# Patient Record
Sex: FEMALE | Race: WHITE | NOT HISPANIC OR LATINO | Employment: OTHER | URBAN - METROPOLITAN AREA
[De-identification: names, ages, dates, MRNs, and addresses within clinical notes are randomized per-mention and may not be internally consistent; named-entity substitution may affect disease eponyms.]

---

## 2017-07-26 ENCOUNTER — TRANSFERRED RECORDS (OUTPATIENT)
Dept: HEALTH INFORMATION MANAGEMENT | Facility: CLINIC | Age: 67
End: 2017-07-26

## 2018-04-03 ENCOUNTER — OFFICE VISIT (OUTPATIENT)
Dept: OBGYN | Facility: CLINIC | Age: 68
End: 2018-04-03
Payer: COMMERCIAL

## 2018-04-03 VITALS
HEIGHT: 63 IN | SYSTOLIC BLOOD PRESSURE: 144 MMHG | BODY MASS INDEX: 33.49 KG/M2 | DIASTOLIC BLOOD PRESSURE: 86 MMHG | WEIGHT: 189 LBS

## 2018-04-03 DIAGNOSIS — R10.2 VULVAR PAIN: ICD-10-CM

## 2018-04-03 DIAGNOSIS — L98.9 GROUPED SKIN LESIONS: Primary | ICD-10-CM

## 2018-04-03 PROCEDURE — 36415 COLL VENOUS BLD VENIPUNCTURE: CPT | Performed by: OBSTETRICS & GYNECOLOGY

## 2018-04-03 PROCEDURE — 86696 HERPES SIMPLEX TYPE 2 TEST: CPT | Performed by: OBSTETRICS & GYNECOLOGY

## 2018-04-03 PROCEDURE — 99214 OFFICE O/P EST MOD 30 MIN: CPT | Performed by: OBSTETRICS & GYNECOLOGY

## 2018-04-03 PROCEDURE — 86694 HERPES SIMPLEX NES ANTBDY: CPT | Performed by: OBSTETRICS & GYNECOLOGY

## 2018-04-03 PROCEDURE — 86695 HERPES SIMPLEX TYPE 1 TEST: CPT | Performed by: OBSTETRICS & GYNECOLOGY

## 2018-04-03 PROCEDURE — 87529 HSV DNA AMP PROBE: CPT | Performed by: OBSTETRICS & GYNECOLOGY

## 2018-04-03 RX ORDER — VALACYCLOVIR HYDROCHLORIDE 1 G/1
1000 TABLET, FILM COATED ORAL 3 TIMES DAILY
Qty: 21 TABLET | Refills: 0 | Status: SHIPPED | OUTPATIENT
Start: 2018-04-03 | End: 2024-02-09

## 2018-04-03 NOTE — MR AVS SNAPSHOT
"              After Visit Summary   4/3/2018    Renetta Bender    MRN: [id number]           Patient Information     Date Of Birth          1950        Visit Information        Provider Department      4/3/2018 11:30 AM Vesta Smith MD [facility]        Today's Diagnoses     Grouped skin lesions    -  1       Follow-ups after your visit        Who to contact     If you have questions or need follow up information about today's clinic visit or your schedule please contact [facility] directly at [phone].  Normal or non-critical lab and imaging results will be communicated to you by comScorehart, letter or phone within 4 business days after the clinic has received the results. If you do not hear from us within 7 days, please contact the clinic through comScorehart or phone. If you have a critical or abnormal lab result, we will notify you by phone as soon as possible.  Submit refill requests through Muxlim or call your pharmacy and they will forward the refill request to us. Please allow 3 business days for your refill to be completed.          Additional Information About Your Visit        MyChart Information     Muxlim lets you send messages to your doctor, view your test results, renew your prescriptions, schedule appointments and more. To sign up, go to [email or web address] . Click on \"Log in\" on the left side of the screen, which will take you to the Welcome page. Then click on \"Sign up Now\" on the right side of the page.     You will be asked to enter the access code listed below, as well as some personal information. Please follow the directions to create your username and password.     Your access code is: [email or web address]  Expires: 2018 12:01 PM     Your access code will  in 90 days. If you need help or a new code, please call your [facility] or [phone].        Care EveryWhere ID     This is your Care EveryWhere ID. This could be used by other " "organizations to access your [place] medical records  [id number]        Your Vitals Were     Height Breastfeeding? BMI (Body Mass Index)             5' 3.25\" (1.607 m) No 33.22 kg/m2          Blood Pressure from Last 3 Encounters:   04/03/18 144/86   02/10/16 128/82   11/14/12 114/78    Weight from Last 3 Encounters:   04/03/18 189 lb (85.7 kg)   02/10/16 181 lb (82.1 kg)   11/14/12 194 lb (88 kg)              We Performed the Following     Herpes Simplex Virus 1 and 2 IgG     Herpes: HSV IgM antibody     HSV 1 and 2 DNA by PCR          Today's Medication Changes          These changes are accurate as of 4/3/18 12:01 PM.  If you have any questions, ask your nurse or doctor.               Start taking these medicines.        Dose/Directions    valACYclovir 1000 mg tablet   Commonly known as:  VALTREX   Used for:  Grouped skin lesions   Started by:  Vesta Smith MD        Dose:  1000 mg   Take 1 tablet (1,000 mg) by mouth 3 times daily   Quantity:  21 tablet   Refills:  0            Where to get your medicines      These medications were sent to [facility] [address] [place] [address] [address] AT [facility] [address]  [address], [facility] [address]     Phone:  [phone]     valACYclovir 1000 mg tablet                Primary Care Provider Office Phone # Fax #    Schuyler Beverly Calderón [phone]       [facility] [address]  [facility] [address]        Equal Access to Services     LEWIS JACOB AH: Hadkwan mederoso Sodonny, waaxda luqadaha, qaybta kaalmada adeegfabio, waxay idikristie nielsen. So [facility] [phone].    ATENCIÓN: Si habla español, tiene a ding disposición servicios gratuitos de asistencia lingüística. Llame al [phone].    We comply with applicable federal civil rights laws and [place] laws. We do not discriminate on the basis of race, color, national origin, age, disability, sex, sexual orientation, or " gender identity.            Thank you!     Thank you for choosing [facility]  for your care. Our goal is always to provide you with excellent care. Hearing back from our patients is one way we can continue to improve our services. Please take a few minutes to complete the written survey that you may receive in the mail after your visit with us. Thank you!             Your Updated Medication List - Protect others around you: Learn how to safely use, store and throw away your medicines at www.disposemymeds.org.          This list is accurate as of 4/3/18 12:01 PM.  Always use your most recent med list.                   Brand Name Dispense Instructions for use Diagnosis    CALCIUM PLUS VITAMIN D PO      Take by mouth daily        clobetasol 0.05 % cream    TEMOVATE    45 g    Apply sparingly to affected area twice daily as needed.  Do not apply to face.    Vulvar itching       FISH OIL + D3 PO      Take by mouth daily        levothyroxine 75 MCG tablet    SYNTHROID/LEVOTHROID     Take by mouth daily        Multi-vitamin Tabs tablet      Take 1 tablet by mouth daily        valACYclovir 1000 mg tablet    VALTREX    21 tablet    Take 1 tablet (1,000 mg) by mouth 3 times daily    Grouped skin lesions

## 2018-04-03 NOTE — PROGRESS NOTES
SUBJECTIVE:                                                   Renetta Bender is a 68 year old female who presents to clinic today for the following health issue(s):  Patient presents with:  Vaginal Problem: possible herpes outbreak, sx's started friday, never had one before, had exposure 30+years ago        HPI:  Patient presents with painful clusters of raised red blister lesions on upper inner left thigh and lower left buttock  Started 2 days ago  Not on vulva  No prior history of similar lesions  Says she had shingles vaccine and has had shingles outbreak in past    She thinks she has a primary herpes outbreak from a recent oral sex encounter, partner has known history of oral herpes  No dysuria, fever      No LMP recorded. Patient is postmenopausal..   Patient is not sexually active, .  Using menopause for contraception.    reports that she has never smoked. She has never used smokeless tobacco.    STD testing offered?  Declined    Health maintenance updated:  yes    Today's PHQ-2 Score: No flowsheet data found.  Today's PHQ-9 Score:   PHQ-9 SCORE 2/10/2016   Total Score 0     Today's SAMEER-7 Score:   SAMEER-7 SCORE 2/10/2016   Total Score 0       Problem list and histories reviewed & adjusted, as indicated.  Additional history: as documented.    There is no problem list on file for this patient.    Past Surgical History:   Procedure Laterality Date     NO HISTORY OF SURGERY        Social History   Substance Use Topics     Smoking status: Never Smoker     Smokeless tobacco: Never Used     Alcohol use 0.0 oz/week     0 Standard drinks or equivalent per week      Problem (# of Occurrences) Relation (Name,Age of Onset)    Breast Cancer (1) Other: aunt    Hyperlipidemia (1) Mother    Hypertension (1) Mother    OSTEOPOROSIS (1) Mother            Current Outpatient Prescriptions   Medication Sig     valACYclovir (VALTREX) 1000 mg tablet Take 1 tablet (1,000 mg) by mouth 3 times daily     clobetasol (TEMOVATE)  "0.05 % cream Apply sparingly to affected area twice daily as needed.  Do not apply to face.     levothyroxine (SYNTHROID, LEVOTHROID) 75 MCG tablet Take by mouth daily     multivitamin, therapeutic with minerals (MULTI-VITAMIN) TABS Take 1 tablet by mouth daily     Calcium Carbonate-Vitamin D (CALCIUM PLUS VITAMIN D PO) Take by mouth daily     Fish Oil-Cholecalciferol (FISH OIL + D3 PO) Take by mouth daily     No current facility-administered medications for this visit.      Allergies   Allergen Reactions     Penicillin G      rash     Sulfa Drugs      rash       ROS:  12 point review of systems negative other than symptoms noted below.    OBJECTIVE:     /86  Ht 5' 3.25\" (1.607 m)  Wt 189 lb (85.7 kg)  Breastfeeding? No  BMI 33.22 kg/m2  Body mass index is 33.22 kg/(m^2).    Exam:  Constitutional:  Appearance: Well nourished, well developed alert, in no acute distress  Skin:General Inspection:  No rashes present, no lesions present, no areas of discoloration; Genitalia and Groin:   Several groups of clustered red blister  lesions present, located on inner upper left thigh and lower buttock on left, no areas of discoloration, no masses present.   The blisters are tender    They look like shingles lesions more than herpes  Neurologic/Psychiatric:  Mental Status:  Oriented X3   Pelvic Exam:  External Genitalia:     Normal appearance for age, no discharge present, no tenderness present, no inflammatory lesions present, color normal               No lesions seen directly on vulva  Vagina:     Normal vaginal vault without central or paravaginal defects, no discharge present, no inflammatory lesions present, no masses present  Bladder:     Nontender to palpation  Urethra:   Urethral Body:  Urethra palpation normal, urethra structural support normal   Urethral Meatus:  No erythema or lesions present  Cervix:     Appearance healthy, no lesions present, nontender to palpation, no bleeding present  Uterus:  "    Uterus: firm, normal sized and nontender, midplane in position.   Adnexa:     No adnexal tenderness present, no adnexal masses present  Perineum:     Perineum within normal limits, no evidence of trauma, no rashes or skin lesions present  Anus:     Anus within normal limits, no hemorrhoids present  Inguinal Lymph Nodes:     No lymphadenopathy present  Pubic Hair:     Normal pubic hair distribution for age  Genitalia and Groin:     No rashes present, no lesions present, no areas of discoloration, no masses present       In-Clinic Test Results:  No results found for this or any previous visit (from the past 24 hour(s)).    ASSESSMENT/PLAN:                                                        ICD-10-CM    1. Grouped skin lesions L98.9 Herpes Simplex Virus 1 and 2 IgG     Herpes: HSV IgM antibody     HSV 1 and 2 DNA by PCR     valACYclovir (VALTREX) 1000 mg tablet   2. Vulvar pain R10.2        Patient denies a history of herpes  Has had shingles in past and was also vaccinated  I unroofed a couple of the lesions to obtain fluid for pcr for herpes  Also sending antibody testing to check prior exposure status  Discussed herpes and shingles as most likely issues in differential diagnosis  Treatment is done with valtrex either way so I sent prescription for valtrex 1g tid for 7 days just in case it is shingles  If pcr comes back herpes , she can reduce the dose in future if she has recurrences  Also discussed poss supressive therapy if she gets recurrences often    Discussed available new shingles vaccine with higher success rate  rec revaccinate one her current lesions are healed        Vesta Smith MD  [facility]

## 2018-04-04 LAB
HSV1 DNA SPEC QL NAA+PROBE: NEGATIVE
HSV1 IGG SERPL QL IA: 6.1 AI (ref 0–0.8)
HSV2 DNA SPEC QL NAA+PROBE: NEGATIVE
HSV2 IGG SERPL QL IA: <0.2 AI (ref 0–0.8)
SPECIMEN SOURCE: NORMAL

## 2018-04-10 LAB — HSV 1+2 IGM SER-IMP: 0.35 INDEX VALUE (ref 0–0.89)

## 2018-08-01 ENCOUNTER — TRANSFERRED RECORDS (OUTPATIENT)
Dept: HEALTH INFORMATION MANAGEMENT | Facility: CLINIC | Age: 68
End: 2018-08-01

## 2019-09-29 ENCOUNTER — HEALTH MAINTENANCE LETTER (OUTPATIENT)
Age: 69
End: 2019-09-29

## 2020-01-21 ENCOUNTER — TRANSFERRED RECORDS (OUTPATIENT)
Dept: HEALTH INFORMATION MANAGEMENT | Facility: CLINIC | Age: 70
End: 2020-01-21

## 2020-01-27 ENCOUNTER — TRANSFERRED RECORDS (OUTPATIENT)
Dept: HEALTH INFORMATION MANAGEMENT | Facility: CLINIC | Age: 70
End: 2020-01-27

## 2020-03-15 ENCOUNTER — HEALTH MAINTENANCE LETTER (OUTPATIENT)
Age: 70
End: 2020-03-15

## 2021-01-14 ENCOUNTER — HEALTH MAINTENANCE LETTER (OUTPATIENT)
Age: 71
End: 2021-01-14

## 2021-01-28 ENCOUNTER — TRANSFERRED RECORDS (OUTPATIENT)
Dept: HEALTH INFORMATION MANAGEMENT | Facility: CLINIC | Age: 71
End: 2021-01-28

## 2021-05-09 ENCOUNTER — HEALTH MAINTENANCE LETTER (OUTPATIENT)
Age: 71
End: 2021-05-09

## 2021-10-24 ENCOUNTER — HEALTH MAINTENANCE LETTER (OUTPATIENT)
Age: 71
End: 2021-10-24

## 2022-06-05 ENCOUNTER — HEALTH MAINTENANCE LETTER (OUTPATIENT)
Age: 72
End: 2022-06-05

## 2022-07-05 ENCOUNTER — LAB REQUISITION (OUTPATIENT)
Dept: LAB | Facility: CLINIC | Age: 72
End: 2022-07-05
Payer: COMMERCIAL

## 2022-07-05 DIAGNOSIS — H92.12 OTORRHEA, LEFT EAR: ICD-10-CM

## 2022-07-05 PROCEDURE — 87077 CULTURE AEROBIC IDENTIFY: CPT | Mod: ORL | Performed by: OTOLARYNGOLOGY

## 2022-07-09 LAB
BACTERIA SPEC CULT: ABNORMAL
BACTERIA SPEC CULT: ABNORMAL

## 2022-10-15 ENCOUNTER — HEALTH MAINTENANCE LETTER (OUTPATIENT)
Age: 72
End: 2022-10-15

## 2023-05-27 ENCOUNTER — HEALTH MAINTENANCE LETTER (OUTPATIENT)
Age: 73
End: 2023-05-27

## 2023-06-11 ENCOUNTER — HEALTH MAINTENANCE LETTER (OUTPATIENT)
Age: 73
End: 2023-06-11

## 2024-01-09 ENCOUNTER — MEDICAL CORRESPONDENCE (OUTPATIENT)
Dept: HEALTH INFORMATION MANAGEMENT | Facility: CLINIC | Age: 74
End: 2024-01-09

## 2024-02-09 RX ORDER — ACETAMINOPHEN 500 MG
1000 TABLET ORAL 3 TIMES DAILY
COMMUNITY
End: 2024-05-03

## 2024-02-09 NOTE — PROGRESS NOTES
PTA medications updated by Medication Scribe prior to surgery via phone call with patient (last doses completed by Nurse)     Medication history sources: Patient and H&P  In the past week, patient estimated taking medication this percent of the time: Greater than 90%      Significant changes made to the medication list:  Patient reports no longer taking the following meds (med scribe removed from PTA med list): calcium,temovate,fishoil,valtrex      Additional medication history information:   None    Medication reconciliation completed by provider prior to medication history? No    Time spent in this activity: 20 minutes    The information provided in this note is only as accurate as the sources available at the time of update(s)      Prior to Admission medications    Medication Sig Last Dose Taking? Auth Provider Long Term End Date   acetaminophen (TYLENOL) 500 MG tablet Take 1,000 mg by mouth 3 times daily 02/11/2024 at pm Yes Reported, Patient     levothyroxine (SYNTHROID, LEVOTHROID) 75 MCG tablet Take by mouth daily 02/12/2024 at am Yes Reported, Patient Yes    multivitamin, therapeutic with minerals (MULTI-VITAMIN) TABS Take 1 tablet by mouth daily 02/06/2024 at am Yes Reported, Patient         Medication history completed by: Danisha Rodriguez

## 2024-02-11 ENCOUNTER — ANESTHESIA EVENT (OUTPATIENT)
Dept: SURGERY | Facility: CLINIC | Age: 74
End: 2024-02-11
Payer: COMMERCIAL

## 2024-02-11 ASSESSMENT — LIFESTYLE VARIABLES: TOBACCO_USE: 0

## 2024-02-11 ASSESSMENT — COPD QUESTIONNAIRES: COPD: 0

## 2024-02-12 ENCOUNTER — APPOINTMENT (OUTPATIENT)
Dept: GENERAL RADIOLOGY | Facility: CLINIC | Age: 74
End: 2024-02-12
Attending: ORTHOPAEDIC SURGERY
Payer: COMMERCIAL

## 2024-02-12 ENCOUNTER — ANESTHESIA (OUTPATIENT)
Dept: SURGERY | Facility: CLINIC | Age: 74
End: 2024-02-12
Payer: COMMERCIAL

## 2024-02-12 ENCOUNTER — HOSPITAL ENCOUNTER (OUTPATIENT)
Facility: CLINIC | Age: 74
Discharge: HOME | End: 2024-02-13
Attending: ORTHOPAEDIC SURGERY | Admitting: ORTHOPAEDIC SURGERY
Payer: COMMERCIAL

## 2024-02-12 ENCOUNTER — APPOINTMENT (OUTPATIENT)
Dept: PHYSICAL THERAPY | Facility: CLINIC | Age: 74
End: 2024-02-12
Attending: ORTHOPAEDIC SURGERY
Payer: COMMERCIAL

## 2024-02-12 DIAGNOSIS — Z96.641 S/P TOTAL RIGHT HIP ARTHROPLASTY: Primary | ICD-10-CM

## 2024-02-12 PROBLEM — Z96.649 S/P TOTAL HIP ARTHROPLASTY: Status: ACTIVE | Noted: 2024-02-12

## 2024-02-12 PROCEDURE — 97116 GAIT TRAINING THERAPY: CPT | Mod: GP

## 2024-02-12 PROCEDURE — 360N000084 HC SURGERY LEVEL 4 W/ FLUORO, PER MIN: Performed by: ORTHOPAEDIC SURGERY

## 2024-02-12 PROCEDURE — 999N000065 XR PELVIS AND HIP PORTABLE RIGHT 1 VIEW

## 2024-02-12 PROCEDURE — 272N000001 HC OR GENERAL SUPPLY STERILE: Performed by: ORTHOPAEDIC SURGERY

## 2024-02-12 PROCEDURE — C1776 JOINT DEVICE (IMPLANTABLE): HCPCS | Performed by: ORTHOPAEDIC SURGERY

## 2024-02-12 PROCEDURE — 97530 THERAPEUTIC ACTIVITIES: CPT | Mod: GP

## 2024-02-12 PROCEDURE — 999N000141 HC STATISTIC PRE-PROCEDURE NURSING ASSESSMENT: Performed by: ORTHOPAEDIC SURGERY

## 2024-02-12 PROCEDURE — C1713 ANCHOR/SCREW BN/BN,TIS/BN: HCPCS | Performed by: ORTHOPAEDIC SURGERY

## 2024-02-12 PROCEDURE — 250N000025 HC SEVOFLURANE, PER MIN: Performed by: ORTHOPAEDIC SURGERY

## 2024-02-12 PROCEDURE — 250N000011 HC RX IP 250 OP 636: Performed by: NURSE ANESTHETIST, CERTIFIED REGISTERED

## 2024-02-12 PROCEDURE — 250N000011 HC RX IP 250 OP 636: Performed by: ORTHOPAEDIC SURGERY

## 2024-02-12 PROCEDURE — 258N000001 HC RX 258: Performed by: ORTHOPAEDIC SURGERY

## 2024-02-12 PROCEDURE — 250N000011 HC RX IP 250 OP 636: Performed by: ANESTHESIOLOGY

## 2024-02-12 PROCEDURE — 258N000003 HC RX IP 258 OP 636: Performed by: NURSE ANESTHETIST, CERTIFIED REGISTERED

## 2024-02-12 PROCEDURE — 250N000009 HC RX 250: Performed by: NURSE ANESTHETIST, CERTIFIED REGISTERED

## 2024-02-12 PROCEDURE — 97161 PT EVAL LOW COMPLEX 20 MIN: CPT | Mod: GP

## 2024-02-12 PROCEDURE — 250N000013 HC RX MED GY IP 250 OP 250 PS 637: Performed by: ORTHOPAEDIC SURGERY

## 2024-02-12 PROCEDURE — 258N000003 HC RX IP 258 OP 636: Performed by: ANESTHESIOLOGY

## 2024-02-12 PROCEDURE — 370N000017 HC ANESTHESIA TECHNICAL FEE, PER MIN: Performed by: ORTHOPAEDIC SURGERY

## 2024-02-12 PROCEDURE — 999N000179 XR SURGERY CARM FLUORO LESS THAN 5 MIN W STILLS

## 2024-02-12 PROCEDURE — 258N000003 HC RX IP 258 OP 636: Performed by: ORTHOPAEDIC SURGERY

## 2024-02-12 PROCEDURE — 710N000009 HC RECOVERY PHASE 1, LEVEL 1, PER MIN: Performed by: ORTHOPAEDIC SURGERY

## 2024-02-12 PROCEDURE — 250N000009 HC RX 250: Performed by: ORTHOPAEDIC SURGERY

## 2024-02-12 DEVICE — IMPLANTABLE DEVICE
Type: IMPLANTABLE DEVICE | Site: HIP | Status: FUNCTIONAL
Brand: G7® ACETABULAR SYSTEM

## 2024-02-12 DEVICE — BIOLOX® DELTA, CERAMIC FEMORAL HEAD, M, Ø 36/0, TAPER 12/14
Type: IMPLANTABLE DEVICE | Site: HIP | Status: FUNCTIONAL
Brand: BIOLOX® DELTA

## 2024-02-12 DEVICE — IMPLANTABLE DEVICE: Type: IMPLANTABLE DEVICE | Site: HIP | Status: FUNCTIONAL

## 2024-02-12 DEVICE — IMPLANTABLE DEVICE
Type: IMPLANTABLE DEVICE | Site: HIP | Status: FUNCTIONAL
Brand: KINECTIV®

## 2024-02-12 RX ORDER — LIDOCAINE 40 MG/G
CREAM TOPICAL
Status: DISCONTINUED | OUTPATIENT
Start: 2024-02-12 | End: 2024-02-13 | Stop reason: HOSPADM

## 2024-02-12 RX ORDER — SODIUM CHLORIDE, SODIUM LACTATE, POTASSIUM CHLORIDE, CALCIUM CHLORIDE 600; 310; 30; 20 MG/100ML; MG/100ML; MG/100ML; MG/100ML
INJECTION, SOLUTION INTRAVENOUS CONTINUOUS
Status: DISCONTINUED | OUTPATIENT
Start: 2024-02-12 | End: 2024-02-13 | Stop reason: HOSPADM

## 2024-02-12 RX ORDER — BISACODYL 10 MG
10 SUPPOSITORY, RECTAL RECTAL DAILY PRN
Status: DISCONTINUED | OUTPATIENT
Start: 2024-02-12 | End: 2024-02-13 | Stop reason: HOSPADM

## 2024-02-12 RX ORDER — FENTANYL CITRATE 50 UG/ML
50 INJECTION, SOLUTION INTRAMUSCULAR; INTRAVENOUS EVERY 5 MIN PRN
Status: DISCONTINUED | OUTPATIENT
Start: 2024-02-12 | End: 2024-02-12 | Stop reason: HOSPADM

## 2024-02-12 RX ORDER — MULTIPLE VITAMINS W/ MINERALS TAB 9MG-400MCG
1 TAB ORAL DAILY
Status: DISCONTINUED | OUTPATIENT
Start: 2024-02-13 | End: 2024-02-13 | Stop reason: HOSPADM

## 2024-02-12 RX ORDER — ASPIRIN 325 MG
325 TABLET, DELAYED RELEASE (ENTERIC COATED) ORAL DAILY
Status: DISCONTINUED | OUTPATIENT
Start: 2024-02-12 | End: 2024-02-13 | Stop reason: HOSPADM

## 2024-02-12 RX ORDER — HYDROXYZINE HYDROCHLORIDE 10 MG/1
10 TABLET, FILM COATED ORAL EVERY 6 HOURS PRN
Qty: 30 TABLET | Refills: 0 | Status: SHIPPED | OUTPATIENT
Start: 2024-02-12 | End: 2024-05-03

## 2024-02-12 RX ORDER — HYDROXYZINE HYDROCHLORIDE 10 MG/1
10 TABLET, FILM COATED ORAL EVERY 6 HOURS PRN
Status: DISCONTINUED | OUTPATIENT
Start: 2024-02-12 | End: 2024-02-13 | Stop reason: HOSPADM

## 2024-02-12 RX ORDER — DEXAMETHASONE SODIUM PHOSPHATE 4 MG/ML
INJECTION, SOLUTION INTRA-ARTICULAR; INTRALESIONAL; INTRAMUSCULAR; INTRAVENOUS; SOFT TISSUE PRN
Status: DISCONTINUED | OUTPATIENT
Start: 2024-02-12 | End: 2024-02-12

## 2024-02-12 RX ORDER — ONDANSETRON 2 MG/ML
INJECTION INTRAMUSCULAR; INTRAVENOUS PRN
Status: DISCONTINUED | OUTPATIENT
Start: 2024-02-12 | End: 2024-02-12

## 2024-02-12 RX ORDER — ACETAMINOPHEN 325 MG/1
650 TABLET ORAL EVERY 4 HOURS PRN
Status: DISCONTINUED | OUTPATIENT
Start: 2024-02-15 | End: 2024-02-13 | Stop reason: HOSPADM

## 2024-02-12 RX ORDER — PROCHLORPERAZINE MALEATE 5 MG
5 TABLET ORAL EVERY 6 HOURS PRN
Status: DISCONTINUED | OUTPATIENT
Start: 2024-02-12 | End: 2024-02-13 | Stop reason: HOSPADM

## 2024-02-12 RX ORDER — HYDROMORPHONE HCL IN WATER/PF 6 MG/30 ML
0.2 PATIENT CONTROLLED ANALGESIA SYRINGE INTRAVENOUS
Status: DISCONTINUED | OUTPATIENT
Start: 2024-02-12 | End: 2024-02-13 | Stop reason: HOSPADM

## 2024-02-12 RX ORDER — CEFAZOLIN SODIUM 2 G/100ML
2 INJECTION, SOLUTION INTRAVENOUS EVERY 8 HOURS
Status: COMPLETED | OUTPATIENT
Start: 2024-02-12 | End: 2024-02-13

## 2024-02-12 RX ORDER — CEFAZOLIN SODIUM/WATER 2 G/20 ML
2 SYRINGE (ML) INTRAVENOUS SEE ADMIN INSTRUCTIONS
Status: DISCONTINUED | OUTPATIENT
Start: 2024-02-12 | End: 2024-02-12 | Stop reason: HOSPADM

## 2024-02-12 RX ORDER — MAGNESIUM HYDROXIDE 1200 MG/15ML
LIQUID ORAL PRN
Status: DISCONTINUED | OUTPATIENT
Start: 2024-02-12 | End: 2024-02-12 | Stop reason: HOSPADM

## 2024-02-12 RX ORDER — HYDROMORPHONE HCL IN WATER/PF 6 MG/30 ML
0.2 PATIENT CONTROLLED ANALGESIA SYRINGE INTRAVENOUS EVERY 5 MIN PRN
Status: DISCONTINUED | OUTPATIENT
Start: 2024-02-12 | End: 2024-02-12 | Stop reason: HOSPADM

## 2024-02-12 RX ORDER — NALOXONE HYDROCHLORIDE 0.4 MG/ML
0.4 INJECTION, SOLUTION INTRAMUSCULAR; INTRAVENOUS; SUBCUTANEOUS
Status: DISCONTINUED | OUTPATIENT
Start: 2024-02-12 | End: 2024-02-13 | Stop reason: HOSPADM

## 2024-02-12 RX ORDER — HYDROMORPHONE HCL IN WATER/PF 6 MG/30 ML
0.4 PATIENT CONTROLLED ANALGESIA SYRINGE INTRAVENOUS EVERY 5 MIN PRN
Status: DISCONTINUED | OUTPATIENT
Start: 2024-02-12 | End: 2024-02-12 | Stop reason: HOSPADM

## 2024-02-12 RX ORDER — NALOXONE HYDROCHLORIDE 0.4 MG/ML
0.2 INJECTION, SOLUTION INTRAMUSCULAR; INTRAVENOUS; SUBCUTANEOUS
Status: DISCONTINUED | OUTPATIENT
Start: 2024-02-12 | End: 2024-02-13 | Stop reason: HOSPADM

## 2024-02-12 RX ORDER — METHOCARBAMOL 500 MG/1
500 TABLET, FILM COATED ORAL EVERY 6 HOURS PRN
Status: DISCONTINUED | OUTPATIENT
Start: 2024-02-12 | End: 2024-02-13 | Stop reason: HOSPADM

## 2024-02-12 RX ORDER — VANCOMYCIN HYDROCHLORIDE 1 G/20ML
INJECTION, POWDER, LYOPHILIZED, FOR SOLUTION INTRAVENOUS PRN
Status: DISCONTINUED | OUTPATIENT
Start: 2024-02-12 | End: 2024-02-12 | Stop reason: HOSPADM

## 2024-02-12 RX ORDER — CEFAZOLIN SODIUM/WATER 2 G/20 ML
2 SYRINGE (ML) INTRAVENOUS
Status: COMPLETED | OUTPATIENT
Start: 2024-02-12 | End: 2024-02-12

## 2024-02-12 RX ORDER — HYDROMORPHONE HYDROCHLORIDE 1 MG/ML
INJECTION, SOLUTION INTRAMUSCULAR; INTRAVENOUS; SUBCUTANEOUS PRN
Status: DISCONTINUED | OUTPATIENT
Start: 2024-02-12 | End: 2024-02-12

## 2024-02-12 RX ORDER — ONDANSETRON 4 MG/1
4 TABLET, ORALLY DISINTEGRATING ORAL EVERY 30 MIN PRN
Status: DISCONTINUED | OUTPATIENT
Start: 2024-02-12 | End: 2024-02-12 | Stop reason: HOSPADM

## 2024-02-12 RX ORDER — DIPHENHYDRAMINE HCL 12.5MG/5ML
12.5 LIQUID (ML) ORAL EVERY 6 HOURS PRN
Status: DISCONTINUED | OUTPATIENT
Start: 2024-02-12 | End: 2024-02-13 | Stop reason: HOSPADM

## 2024-02-12 RX ORDER — ONDANSETRON 2 MG/ML
4 INJECTION INTRAMUSCULAR; INTRAVENOUS EVERY 6 HOURS PRN
Status: DISCONTINUED | OUTPATIENT
Start: 2024-02-12 | End: 2024-02-13 | Stop reason: HOSPADM

## 2024-02-12 RX ORDER — CEFAZOLIN SODIUM/WATER 2 G/20 ML
2 SYRINGE (ML) INTRAVENOUS EVERY 8 HOURS
Qty: 40 ML | Refills: 0 | Status: DISCONTINUED | OUTPATIENT
Start: 2024-02-12 | End: 2024-02-12

## 2024-02-12 RX ORDER — OXYCODONE HYDROCHLORIDE 5 MG/1
5 TABLET ORAL EVERY 4 HOURS PRN
Status: DISCONTINUED | OUTPATIENT
Start: 2024-02-12 | End: 2024-02-13 | Stop reason: HOSPADM

## 2024-02-12 RX ORDER — EPHEDRINE SULFATE 50 MG/ML
INJECTION, SOLUTION INTRAMUSCULAR; INTRAVENOUS; SUBCUTANEOUS PRN
Status: DISCONTINUED | OUTPATIENT
Start: 2024-02-12 | End: 2024-02-12

## 2024-02-12 RX ORDER — AMOXICILLIN 250 MG
1 CAPSULE ORAL 2 TIMES DAILY
Status: DISCONTINUED | OUTPATIENT
Start: 2024-02-12 | End: 2024-02-13 | Stop reason: HOSPADM

## 2024-02-12 RX ORDER — HYDROMORPHONE HCL IN WATER/PF 6 MG/30 ML
0.4 PATIENT CONTROLLED ANALGESIA SYRINGE INTRAVENOUS
Status: DISCONTINUED | OUTPATIENT
Start: 2024-02-12 | End: 2024-02-13 | Stop reason: HOSPADM

## 2024-02-12 RX ORDER — ACETAMINOPHEN 325 MG/1
975 TABLET ORAL EVERY 8 HOURS
Status: DISCONTINUED | OUTPATIENT
Start: 2024-02-12 | End: 2024-02-13 | Stop reason: HOSPADM

## 2024-02-12 RX ORDER — TRANEXAMIC ACID 650 MG/1
1950 TABLET ORAL ONCE
Status: COMPLETED | OUTPATIENT
Start: 2024-02-12 | End: 2024-02-12

## 2024-02-12 RX ORDER — AMOXICILLIN 250 MG
1-2 CAPSULE ORAL 2 TIMES DAILY
Qty: 30 TABLET | Refills: 0 | Status: SHIPPED | OUTPATIENT
Start: 2024-02-12 | End: 2024-05-03

## 2024-02-12 RX ORDER — FENTANYL CITRATE 50 UG/ML
INJECTION, SOLUTION INTRAMUSCULAR; INTRAVENOUS PRN
Status: DISCONTINUED | OUTPATIENT
Start: 2024-02-12 | End: 2024-02-12

## 2024-02-12 RX ORDER — ONDANSETRON 2 MG/ML
4 INJECTION INTRAMUSCULAR; INTRAVENOUS EVERY 30 MIN PRN
Status: DISCONTINUED | OUTPATIENT
Start: 2024-02-12 | End: 2024-02-12 | Stop reason: HOSPADM

## 2024-02-12 RX ORDER — SODIUM CHLORIDE, SODIUM LACTATE, POTASSIUM CHLORIDE, CALCIUM CHLORIDE 600; 310; 30; 20 MG/100ML; MG/100ML; MG/100ML; MG/100ML
INJECTION, SOLUTION INTRAVENOUS CONTINUOUS
Status: DISCONTINUED | OUTPATIENT
Start: 2024-02-12 | End: 2024-02-12 | Stop reason: HOSPADM

## 2024-02-12 RX ORDER — OXYCODONE HYDROCHLORIDE 5 MG/1
10 TABLET ORAL EVERY 4 HOURS PRN
Status: DISCONTINUED | OUTPATIENT
Start: 2024-02-12 | End: 2024-02-13 | Stop reason: HOSPADM

## 2024-02-12 RX ORDER — DEXMEDETOMIDINE HYDROCHLORIDE 4 UG/ML
INJECTION, SOLUTION INTRAVENOUS PRN
Status: DISCONTINUED | OUTPATIENT
Start: 2024-02-12 | End: 2024-02-12

## 2024-02-12 RX ORDER — ONDANSETRON 4 MG/1
4 TABLET, ORALLY DISINTEGRATING ORAL EVERY 6 HOURS PRN
Status: DISCONTINUED | OUTPATIENT
Start: 2024-02-12 | End: 2024-02-13 | Stop reason: HOSPADM

## 2024-02-12 RX ORDER — FENTANYL CITRATE 50 UG/ML
25 INJECTION, SOLUTION INTRAMUSCULAR; INTRAVENOUS EVERY 5 MIN PRN
Status: DISCONTINUED | OUTPATIENT
Start: 2024-02-12 | End: 2024-02-12 | Stop reason: HOSPADM

## 2024-02-12 RX ORDER — ACETAMINOPHEN 325 MG/1
650 TABLET ORAL EVERY 4 HOURS PRN
Qty: 100 TABLET | Refills: 0 | Status: SHIPPED | OUTPATIENT
Start: 2024-02-12 | End: 2024-02-12

## 2024-02-12 RX ORDER — LIDOCAINE HYDROCHLORIDE 20 MG/ML
INJECTION, SOLUTION INFILTRATION; PERINEURAL PRN
Status: DISCONTINUED | OUTPATIENT
Start: 2024-02-12 | End: 2024-02-12

## 2024-02-12 RX ORDER — ASPIRIN 325 MG
325 TABLET, DELAYED RELEASE (ENTERIC COATED) ORAL DAILY
Qty: 30 TABLET | Refills: 0 | Status: SHIPPED | OUTPATIENT
Start: 2024-02-12 | End: 2024-05-03

## 2024-02-12 RX ORDER — PROPOFOL 10 MG/ML
INJECTION, EMULSION INTRAVENOUS PRN
Status: DISCONTINUED | OUTPATIENT
Start: 2024-02-12 | End: 2024-02-12

## 2024-02-12 RX ORDER — POLYETHYLENE GLYCOL 3350 17 G/17G
17 POWDER, FOR SOLUTION ORAL DAILY
Status: DISCONTINUED | OUTPATIENT
Start: 2024-02-13 | End: 2024-02-13 | Stop reason: HOSPADM

## 2024-02-12 RX ORDER — OXYCODONE HYDROCHLORIDE 5 MG/1
5-10 TABLET ORAL EVERY 4 HOURS PRN
Qty: 25 TABLET | Refills: 0 | Status: SHIPPED | OUTPATIENT
Start: 2024-02-12 | End: 2024-05-03

## 2024-02-12 RX ORDER — LEVOTHYROXINE SODIUM 75 UG/1
75 TABLET ORAL DAILY
Status: DISCONTINUED | OUTPATIENT
Start: 2024-02-13 | End: 2024-02-13 | Stop reason: HOSPADM

## 2024-02-12 RX ADMIN — Medication 2 G: at 09:24

## 2024-02-12 RX ADMIN — DEXMEDETOMIDINE HYDROCHLORIDE 12 MCG: 200 INJECTION INTRAVENOUS at 11:52

## 2024-02-12 RX ADMIN — HYDROXYZINE HYDROCHLORIDE 10 MG: 10 TABLET, FILM COATED ORAL at 16:54

## 2024-02-12 RX ADMIN — PROPOFOL 180 MG: 10 INJECTION, EMULSION INTRAVENOUS at 09:20

## 2024-02-12 RX ADMIN — SODIUM CHLORIDE, POTASSIUM CHLORIDE, SODIUM LACTATE AND CALCIUM CHLORIDE: 600; 310; 30; 20 INJECTION, SOLUTION INTRAVENOUS at 07:48

## 2024-02-12 RX ADMIN — Medication 5 MG: at 10:39

## 2024-02-12 RX ADMIN — FENTANYL CITRATE 25 MCG: 50 INJECTION, SOLUTION INTRAMUSCULAR; INTRAVENOUS at 13:03

## 2024-02-12 RX ADMIN — FENTANYL CITRATE 100 MCG: 50 INJECTION INTRAMUSCULAR; INTRAVENOUS at 09:20

## 2024-02-12 RX ADMIN — FENTANYL CITRATE 25 MCG: 50 INJECTION, SOLUTION INTRAMUSCULAR; INTRAVENOUS at 13:38

## 2024-02-12 RX ADMIN — SUGAMMADEX 200 MG: 100 INJECTION, SOLUTION INTRAVENOUS at 11:57

## 2024-02-12 RX ADMIN — ROCURONIUM BROMIDE 20 MG: 50 INJECTION, SOLUTION INTRAVENOUS at 10:01

## 2024-02-12 RX ADMIN — ASPIRIN 325 MG: 325 TABLET, COATED ORAL at 20:04

## 2024-02-12 RX ADMIN — OXYCODONE HYDROCHLORIDE 5 MG: 5 TABLET ORAL at 16:54

## 2024-02-12 RX ADMIN — TRANEXAMIC ACID 1950 MG: 650 TABLET ORAL at 07:24

## 2024-02-12 RX ADMIN — DEXAMETHASONE SODIUM PHOSPHATE 10 MG: 4 INJECTION, SOLUTION INTRA-ARTICULAR; INTRALESIONAL; INTRAMUSCULAR; INTRAVENOUS; SOFT TISSUE at 09:38

## 2024-02-12 RX ADMIN — FENTANYL CITRATE 50 MCG: 50 INJECTION, SOLUTION INTRAMUSCULAR; INTRAVENOUS at 12:36

## 2024-02-12 RX ADMIN — ROCURONIUM BROMIDE 10 MG: 50 INJECTION, SOLUTION INTRAVENOUS at 10:39

## 2024-02-12 RX ADMIN — ONDANSETRON 4 MG: 2 INJECTION INTRAMUSCULAR; INTRAVENOUS at 11:05

## 2024-02-12 RX ADMIN — HYDROMORPHONE HYDROCHLORIDE 0.5 MG: 1 INJECTION, SOLUTION INTRAMUSCULAR; INTRAVENOUS; SUBCUTANEOUS at 10:04

## 2024-02-12 RX ADMIN — SODIUM CHLORIDE, POTASSIUM CHLORIDE, SODIUM LACTATE AND CALCIUM CHLORIDE: 600; 310; 30; 20 INJECTION, SOLUTION INTRAVENOUS at 15:13

## 2024-02-12 RX ADMIN — ACETAMINOPHEN 975 MG: 325 TABLET, FILM COATED ORAL at 23:01

## 2024-02-12 RX ADMIN — LIDOCAINE HYDROCHLORIDE 100 MG: 20 INJECTION, SOLUTION INFILTRATION; PERINEURAL at 09:20

## 2024-02-12 RX ADMIN — PHENYLEPHRINE HYDROCHLORIDE 100 MCG: 10 INJECTION INTRAVENOUS at 11:09

## 2024-02-12 RX ADMIN — SODIUM CHLORIDE, POTASSIUM CHLORIDE, SODIUM LACTATE AND CALCIUM CHLORIDE: 600; 310; 30; 20 INJECTION, SOLUTION INTRAVENOUS at 11:05

## 2024-02-12 RX ADMIN — ROCURONIUM BROMIDE 50 MG: 50 INJECTION, SOLUTION INTRAVENOUS at 09:21

## 2024-02-12 RX ADMIN — PHENYLEPHRINE HYDROCHLORIDE 100 MCG: 10 INJECTION INTRAVENOUS at 10:39

## 2024-02-12 RX ADMIN — CEFAZOLIN SODIUM 2 G: 2 INJECTION, SOLUTION INTRAVENOUS at 16:54

## 2024-02-12 ASSESSMENT — ACTIVITIES OF DAILY LIVING (ADL)
ADLS_ACUITY_SCORE: 20
ADLS_ACUITY_SCORE: 20
ADLS_ACUITY_SCORE: 28
ADLS_ACUITY_SCORE: 20
ADLS_ACUITY_SCORE: 35
ADLS_ACUITY_SCORE: 20
ADLS_ACUITY_SCORE: 20
ADLS_ACUITY_SCORE: 28
ADLS_ACUITY_SCORE: 28

## 2024-02-12 NOTE — ANESTHESIA POSTPROCEDURE EVALUATION
Patient: Renetta Bender    Procedure: Procedure(s):  RIGHT DIRECT ANTERIOR TOTAL HIP ARTHROPLASTY       Anesthesia Type:  General    Note:  Disposition: Inpatient   Postop Pain Control: Uneventful            Sign Out: Well controlled pain   PONV: No   Neuro/Psych: Uneventful            Sign Out: Acceptable/Baseline neuro status   Airway/Respiratory: Uneventful            Sign Out: Acceptable/Baseline resp. status   CV/Hemodynamics: Uneventful            Sign Out: Acceptable CV status; No obvious hypovolemia; No obvious fluid overload   Other NRE: NONE   DID A NON-ROUTINE EVENT OCCUR? No           Last vitals:  Vitals Value Taken Time   /78 02/12/24 1400   Temp 36.4  C (97.5  F) 02/12/24 1345   Pulse 87 02/12/24 1414   Resp 15 02/12/24 1414   SpO2 98 % 02/12/24 1414   Vitals shown include unfiled device data.    Electronically Signed By: Bakari Dye MD  February 12, 2024  2:15 PM

## 2024-02-12 NOTE — BRIEF OP NOTE
[facility]    Brief Operative Note    Pre-operative diagnosis: Osteoarthritis of right hip, unspecified osteoarthritis type [M16.11]  Post-operative diagnosis Same as pre-operative diagnosis    Procedure: RIGHT DIRECT ANTERIOR TOTAL HIP ARTHROPLASTY, Right - Hip    Surgeon: Surgeon(s) and Role:     * Cem Lopez MD - Primary     * Aimee Roca - Assisting  Anesthesia: General   Estimated Blood Loss: 300 ml    Drains: None  Specimens:   ID Type Source Tests Collected by Time Destination   A : Bone and Tissue Tissue Hip, Right OR DOCUMENTATION ONLY Cem Lopez MD 2/12/2024 10:06 AM      Findings:   Advanced DJD right hip .  Complications: None.  Implants:   Implant Name Type Inv. Item Serial No.  Lot No. LRB No. Used Action   IMP SHELL BIOM G7 ACETAB PPS CARABALLO HOLE 50MM SZ D [id number] - [id number] Total Joint Component/Insert IMP SHELL BIOM G7 ACETAB PPS CARABALLO HOLE 50MM SZ D [id number]  [employer] [id number] Right 1 Implanted   Polyethylene Liner 36mmID  size D     [id number] Right 1 Implanted   IMP SCR ZIM 6.5X20MM ACET CUP SELF TAP -715-20 - [id number] Metallic Hardware/[place] IMP SCR ZIM 6.5X20MM ACET CUP SELF TAP -761-20  [employer] [id number] Right 1 Implanted   IMP HEAD FEM ZIM BIOLOX DELTA CER 36MM +0 -032-02 - [id number] Total Joint Component/Insert IMP HEAD FEM ZIM BIOLOX DELTA CER 36MM +0 -174-02  [employer] [id number] Right 1 Implanted   IMP STEM ZIM TAPER KINECTIV M/L SZ 7.5 -177-00 - [id number] Total Joint Component/Insert IMP STEM ZIM TAPER KINECTIV M/L SZ 7.5 -880-00  [employer] [id number] Right 1 Implanted   IMP FEMORAL NECK ZIM MOD TAPER KINECTIV P -164-00 - [id number] Total Joint Component/Insert IMP FEMORAL NECK ZIM MOD TAPER KINECTIV P -265-00  [employer] [id number] Right 1 Implanted

## 2024-02-12 NOTE — ANESTHESIA PROCEDURE NOTES
Airway       Patient location during procedure: OR       Procedure Start/Stop Times: 2/12/2024 9:23 AM  Staff -        Performed By: anesthesiologist and CRNA  Consent for Airway        Urgency: elective  Indications and Patient Condition       Indications for airway management: vanita-procedural       Induction type:intravenous       Mask difficulty assessment: 2 - vent by mask + OA or adjuvant +/- NMBA    Final Airway Details       Final airway type: endotracheal airway       Successful airway: ETT - single  Endotracheal Airway Details        ETT size (mm): 7.0       Cuffed: yes       Successful intubation technique: direct laryngoscopy       DL Blade Type: Torres 2       Grade View of Cords: 1       Adjucts: stylet       Position: Right       Measured from: gums/teeth       Secured at (cm): 20       Bite block used: None    Post intubation assessment        Placement verified by: capnometry, equal breath sounds and chest rise        Number of attempts at approach: 1       Number of other approaches attempted: 0       Secured with: tape       Ease of procedure: easy       Dentition: Intact and Unchanged    Medication(s) Administered   Medication Administration Time: 2/12/2024 9:23 AM

## 2024-02-12 NOTE — OP NOTE
Procedure Date: 02/12/24     PREOPERATIVE DIAGNOSIS:  Advanced degenerative arthritis of the right hip.     POSTOPERATIVE DIAGNOSIS:  Advanced degenerative arthritis of the right hip.     PROCEDURE:  right direct anterior total hip arthroplasty.     SURGEON:  Cem Lopez MD.     FIRST ASSISTANT:  YELITZA Choi.     SECOND ASSISTANT:  Aimee Roca ATC.     PROCEDURE IN DETAIL:  The patient was brought to the operating room and given a general anesthetic.  The patient was placed supine on the radiolucent operating table, and their right hip and right lower extremity were then prepped and draped in sterile fashion.  An incision was made over the anterior aspect of the hip.  This was carried down through subcutaneous tissues, down to the fascia.  The fascia was incised longitudinally, and the interval between the tensor fascia martin and sartorius was developed deep.  The rectus muscle was reflected medially, exposing the circumflex vessels, which were cauterized.  The hip capsule was then exposed.  We excised the anterior capsule, exposing the femoral head and neck.  An osteotomy was then made in the femoral neck at the level of our preoperatively templated osteotomy level.  Another osteotomy was made just below the femoral head, and the neck and head fragments were removed separately.  There were advanced degenerative changes consistent with the pre-operative imaging studies.  Attention was then turned to the acetabulum.  The acetabulum was carefully reamed to 49 mm.  A size 50 Biomet G7 cup was then impacted into place.  This had excellent fixation, which was supplemented with one screw, which also had good fixation.  A standard highly crosslink polyethylene liner to accept a size 36 head was then snapped into the acetabular component.  Attention was then turned to the femur.  The piriformis was released, allowing excellent exposure of the opening of the femoral canal.  The canal was opened with a starter  reamer and lateralized with a lateralizing reamer, then we sequentially broached the hip up to a size 7.5 M/L taper broach.  This appeared to fit nicely.  The broach was removed, and a real size 7.5 Kinectiv stem was then impacted into the femur.  The stem had excellent fixation.  The hip was then trialed with various neck lengths and offsets, and it appeared that an extended offset, -4 neck gave the best fit.  A real extended offset, -4 neck was impacted onto the stem with a size 36, +0 ceramic head.  The hip was relocated.  The soft tissue tension appeared satisfactory, although she did have generalized ligamentous laxity.  Despite her ligamentous laxity, the hip was quite stable through full range of motion, and the leg length and offset appeared to be restored.  The wound was then irrigated, first, with a dilute solution of Betadine, which was allowed to sit within the wound for 3 minutes and then was thoroughly irrigated from the wound with a liter of normal saline.  We then sprinkled a gram of vancomycin powder into the wound.  The fascia was closed with some interrupted 0 Vicryl sutures, followed by running #1 Vicryl suture.  The skin was closed with 2-0 Vicryl subcutaneous sutures and a 3-0 Stratafix running subcuticular suture and Steri-Strips. A sterile dressing was applied to the wound.  The patient was then awakened from anesthesia and transferred to postanesthesia recovery in satisfactory condition.     It should be noted that my assistants were necessary throughout the entire procedure to assist with retraction and positioning.     Cem Lopez MD

## 2024-02-12 NOTE — PROGRESS NOTES
02/12/24 1700   Appointment Info   Signing Clinician's Name / Credentials (PT) Moustapha Chairez DPT   Rehab Comments (PT) WBAT RLE, no precautions   Quick Adds   Quick Adds Certification   Living Environment   People in Home alone   Current Living Arrangements condominium   Home Accessibility stairs to enter home   Number of Stairs, Main Entrance greater than 10 stairs   Stair Railings, Main Entrance railing on right side (ascending)   Transportation Anticipated family or friend will provide   Living Environment Comments [facility], will have friend come pick her up from hospital and stay with her for at least a few days and longer if needed for assist, 15 ADIN with all needs met on first floor   Self-Care   Usual Activity Tolerance good   Current Activity Tolerance moderate   Equipment Currently Used at Home none   Fall history within last six months no   Activity/Exercise/Self-Care Comment At baseline pt is IND with mobility and ADL's, has SPC and FWW at home   General Information   Onset of Illness/Injury or Date of Surgery 02/12/24   Referring Physician Cem Lopez MD   Patient/Family Therapy Goals Statement (PT) go home   Pertinent History of Current Problem (include personal factors and/or comorbidities that impact the POC) Pt is a 73 y.o. female s/p R direct anterior DEVORAH on 2/12/2024, POD#0   Existing Precautions/Restrictions fall;weight bearing   Weight-Bearing Status - LLE full weight-bearing   Weight-Bearing Status - RLE weight-bearing as tolerated   Cognition   Affect/Mental Status (Cognition) WNL   Orientation Status (Cognition) oriented x 4   Follows Commands (Cognition) WNL   Pain Assessment   Patient Currently in Pain Yes, see Vital Sign flowsheet  (7/10 R hip pain)   Integumentary/Edema   Integumentary/Edema no deficits were identifed   Posture    Posture Forward head position   Range of Motion (ROM)   Range of Motion ROM deficits secondary to surgical procedure;ROM deficits  secondary to pain;ROM deficits secondary to weakness   ROM Comment Deficits with R hip post-op, otherwise appears grossly WFL   Strength (Manual Muscle Testing)   Strength (Manual Muscle Testing) Able to perform L SLR;Deficits observed during functional mobility   Strength Comments Not formally assessed, deficits noted with R hip post-op   Bed Mobility   Comment, (Bed Mobility) supine<>sit SBA   Transfers   Comment, (Transfers) sit<>stand with FWW CGA   Gait/Stairs (Locomotion)   [place] Level (Gait) contact guard   Assistive Device (Gait) walker, front-wheeled   Distance in Feet (Gait) 10'   Pattern (Gait) step-through   Deviations/Abnormal Patterns (Gait) antalgic;rivas decreased;gait speed decreased;stride length decreased;weight shifting decreased   Maintains Weight-bearing Status (Gait) able to maintain   Balance   Balance Comments good standing and sitting balance, deficits with dynamic balance   Sensory Examination   Sensory Perception patient reports no sensory changes   Clinical Impression   Criteria for Skilled Therapeutic Intervention Yes, treatment indicated   PT Diagnosis (PT) impaired gait   Influenced by the following impairments pain, deficits with ROM, strength, balance   Functional limitations due to impairments bed mobility, transfers, amb, ADL's   Clinical Presentation (PT Evaluation Complexity) stable   Clinical Presentation Rationale PMH and clinical judgement   Clinical Decision Making (Complexity) low complexity   Planned Therapy Interventions (PT) balance training;bed mobility training;cryotherapy;gait training;home exercise program;patient/family education;ROM (range of motion);stair training;strengthening;stretching;transfer training;progressive activity/exercise   Risk & Benefits of therapy have been explained evaluation/treatment results reviewed;care plan/treatment goals reviewed;risks/benefits reviewed;current/potential barriers reviewed;participants voiced agreement with care  plan;patient;participants included   PT Total Evaluation Time   PT Eval, Low Complexity Minutes (52974) 10   Therapy Certification   Start of care date 02/12/24   Certification date from 02/12/24   Certification date to 02/19/24   Medical Diagnosis R DEVORAH   Physical Therapy Goals   PT Frequency 2x/day   PT Predicted Duration/Target Date for Goal Attainment 02/13/24   PT Goals Bed Mobility;Transfers;Gait;Stairs   PT: Bed Mobility Supine to/from sit;Rolling;Bridging;Modified independent   PT: Transfers Supervision/stand-by assist;Sit to/from stand;Assistive device   PT: Gait Supervision/stand-by assist;Rolling walker;150 feet   PT: Stairs Minimal assist;Greater than 10 stairs;Rail on right   Interventions   Interventions Quick Adds Gait Training;Therapeutic Activity;Therapeutic Procedure   Therapeutic Procedure/Exercise   Ther. Procedure: strength, endurance, ROM, flexibillity Minutes (81974) 1   Symptoms Noted During/After Treatment none   Treatment Detail/Skilled Intervention Educated on circulatory benefits of AP's after surgery and to perform throughout the day whenever resting. With pt in supine cued for AP's x 30 and pt tolerated well   Therapeutic Activity   Therapeutic Activities: dynamic activities to improve functional performance Minutes (63632) 15   Symptoms Noted During/After Treatment Increased pain   Treatment Detail/Skilled Intervention Greeted pt supine in bed. Eval completed. Educated on orders for WBAT and no hip precautions, pt verbalized understanding. With HOB elevated supine>sit SBA, cues for sequencing and hand placement on rails, pt with increased effort and time to complete, painful. After walking, with HOB flat sit>supine SBA-CGA, even more difficulty with cues for sequencing throughout. With pt supine in bed Mod-I with repositioning. Sit<>stand with FWW CGA, cues for safe walker and hand placement, pt executing slowly and mildly unstable. After ambulating pt felt light-headed when back to  bed, vitals taken and VSS on RA. Increased time for line management and room set up   Gait Training   Gait Training Minutes (39012) 8   Symptoms Noted During/After Treatment (Gait Training) none   Treatment Detail/Skilled Intervention Pt amb in room and into hallway ~125' with FWW CGA-SBA. Pt using slow gait speed with slow velocity of movements, step-through pattern needing cues for even step length. Gait was antalgic and pt appeared mildly unsteady but no overt LOB. Cues for safe walker placement and forward gaze   Distance in Feet 125'   [place] Level (Gait Training) contact guard   Physical Assistance Level (Gait Training) 1 person assist   Weight Bearing (Gait Training) weight-bearing as tolerated   Assistive Device (Gait Training) rolling walker   PT Discharge Planning   PT Plan Progress gait with FWW, trial steps, progress bed mobility and transfers   PT Discharge Recommendation (DC Rec)   (defer to ortho)   PT Rationale for DC Rec Pt below baseline but moving well and using safe technique. Anticipate pt will progress and by discharge be at/near SBA bed mobility, SBA transfers with FWW, SBA amb with FWW, Elida for steps. Pt will have good 24/7 support at home from friend who will be staying for at least several days   PT Brief overview of current status CGA-SBA with FWW   Total Session Time   Timed Code Treatment Minutes 24   Total Session Time (sum of timed and untimed services) 34       [facility]  OUTPATIENT PHYSICAL THERAPY EVALUATION  PLAN OF TREATMENT FOR OUTPATIENT REHABILITATION  (COMPLETE FOR INITIAL CLAIMS ONLY)  Patient's Last Name, First Name, M.I.  YOB: 1950  Renetta Bender                        Provider's Name  [facility] Medical Record No.  [id number]                             Onset Date:  02/12/24   Start of Care Date:  02/12/24   Type:     _X_PT   ___OT   ___SLP Medical Diagnosis:  R DEVORAH              PT  Diagnosis:  impaired gait Visits from SOC:  1     See note for plan of treatment, functional goals and certification details    I CERTIFY THE NEED FOR THESE SERVICES FURNISHED UNDER        THIS PLAN OF TREATMENT AND WHILE UNDER MY CARE     (Physician co-signature of this document indicates review and certification of the therapy plan).

## 2024-02-12 NOTE — ANESTHESIA CARE TRANSFER NOTE
Patient: Renetta Bender    Procedure: Procedure(s):  RIGHT DIRECT ANTERIOR TOTAL HIP ARTHROPLASTY       Diagnosis: Osteoarthritis of right hip, unspecified osteoarthritis type [M16.11]  Diagnosis Additional Information: No value filed.    Anesthesia Type:   General     Note:    Oropharynx: oropharynx clear of all foreign objects and spontaneously breathing  Level of Consciousness: awake  Oxygen Supplementation: face mask  Level of Supplemental Oxygen (L/min / FiO2): 6  Independent Airway: airway patency satisfactory and stable  Dentition: dentition unchanged  Vital Signs Stable: post-procedure vital signs reviewed and stable  Report to RN Given: handoff report given  Patient transferred to: PACU  Comments: Pt to PACU with O2 via mask, airway patent, VSS. Report to RN.  Handoff Report: Identifed the Patient, Identified the Reponsible Provider, Reviewed the pertinent medical history, Discussed the surgical course, Reviewed Intra-OP anesthesia mangement and issues during anesthesia, Set expectations for post-procedure period and Allowed opportunity for questions and acknowledgement of understanding  Vitals:  Vitals Value Taken Time   /84 02/12/24 1205   Temp     Pulse 98 02/12/24 1207   Resp 0 02/12/24 1207   SpO2 99 % 02/12/24 1207   Vitals shown include unfiled device data.    Electronically Signed By: VARINDER Palacio CRNA  February 12, 2024  12:08 PM

## 2024-02-12 NOTE — ANESTHESIA PREPROCEDURE EVALUATION
Anesthesia Pre-Procedure Evaluation    Patient: Renetta Bender   MRN: [id number] : 1950        Procedure : Procedure(s):  RIGHT DIRECT ANTERIOR TOTAL HIP ARTHROPLASTY          Past Medical History:   Diagnosis Date    Arthritis     History of colonic polyps     Hyperlipidemia     Hypothyroidism     Lichen sclerosus of female genitalia     Vulva    Osteoarthritis of hip     Right    Osteopenia     Seasonal allergic rhinitis     Situational depression       Past Surgical History:   Procedure Laterality Date    NO HISTORY OF SURGERY        Allergies   Allergen Reactions    Penicillin G      rash    Sulfa Antibiotics      rash      Social History     Tobacco Use    Smoking status: Never    Smokeless tobacco: Never   Substance Use Topics    Alcohol use: Yes     Alcohol/week: 0.0 standard drinks of alcohol      Wt Readings from Last 1 Encounters:   18 85.7 kg (189 lb)        Anesthesia Evaluation   Pt has not had prior anesthetic         ROS/MED HX  ENT/Pulmonary:    (-) tobacco use, asthma, COPD, sleep apnea and recent URI   Neurologic:  - neg neurologic ROS     Cardiovascular:     (+) Dyslipidemia - -   -  - -                                   (-) CAD   METS/Exercise Tolerance:     Hematologic:  - neg hematologic  ROS     Musculoskeletal:   (+)  arthritis,             GI/Hepatic: Comment: Intermittent GERD,  none recently    (+) GERD,                (-) liver disease   Renal/Genitourinary:    (-) renal disease   Endo:     (+)          thyroid problem, hypothyroidism,        (-) Type I DM and Type II DM   Psychiatric/Substance Use:     (+) psychiatric history anxiety and depression       Infectious Disease:       Malignancy:       Other:            Physical Exam    Airway        Mallampati: II   TM distance: > 3 FB   Neck ROM: full   Mouth opening: > 3 cm    Respiratory Devices and Support         Dental       (+) Minor Abnormalities - some fillings, tiny chips      Cardiovascular          Rhythm and rate:  "regular and normal     Pulmonary   pulmonary exam normal                OUTSIDE LABS:  CBC:   Lab Results   Component Value Date    WBC 9.8 11/01/2014    HGB 14.1 11/01/2014    HCT 42.6 11/01/2014     11/01/2014     BMP:   Lab Results   Component Value Date     11/01/2014    POTASSIUM 4.0 11/01/2014    CHLORIDE 106 11/01/2014    CO2 26 11/01/2014    BUN 13 11/01/2014    CR 1.01 11/01/2014    GLC 99 11/01/2014     COAGS: No results found for: \"PTT\", \"INR\", \"FIBR\"  POC: No results found for: \"BGM\", \"HCG\", \"HCGS\"  HEPATIC: No results found for: \"ALBUMIN\", \"PROTTOTAL\", \"ALT\", \"AST\", \"GGT\", \"ALKPHOS\", \"BILITOTAL\", \"BILIDIRECT\", \"MARIA DOLORES\"  OTHER:   Lab Results   Component Value Date    ERASMO 8.6 11/01/2014       Anesthesia Plan    ASA Status:  2    NPO Status:  NPO Appropriate    Anesthesia Type: General.     - Airway: ETT   Induction: Intravenous.   Maintenance: Balanced.        Consents    Anesthesia Plan(s) and associated risks, benefits, and realistic alternatives discussed. Questions answered and patient/representative(s) expressed understanding.     - Discussed:     - Discussed with:  Patient      - Extended Intubation/Ventilatory Support Discussed: Yes.      - Patient is DNR/DNI Status: No     Use of blood products discussed: Yes.     - Discussed with: Patient.     - Consented: consented to blood products     Postoperative Care    Pain management: Multi-modal analgesia.   PONV prophylaxis: Ondansetron (or other 5HT-3), Dexamethasone or Solumedrol     Comments:               Bakari Dye MD    I have reviewed the pertinent notes and labs in the chart from the past 30 days and (re)examined the patient.  Any updates or changes from those notes are reflected in this note.                "

## 2024-02-13 ENCOUNTER — APPOINTMENT (OUTPATIENT)
Dept: PHYSICAL THERAPY | Facility: CLINIC | Age: 74
End: 2024-02-13
Attending: ORTHOPAEDIC SURGERY
Payer: COMMERCIAL

## 2024-02-13 ENCOUNTER — APPOINTMENT (OUTPATIENT)
Dept: OCCUPATIONAL THERAPY | Facility: CLINIC | Age: 74
End: 2024-02-13
Attending: ORTHOPAEDIC SURGERY
Payer: COMMERCIAL

## 2024-02-13 VITALS
BODY MASS INDEX: 29.59 KG/M2 | SYSTOLIC BLOOD PRESSURE: 134 MMHG | WEIGHT: 167 LBS | HEART RATE: 85 BPM | TEMPERATURE: 98 F | OXYGEN SATURATION: 95 % | RESPIRATION RATE: 18 BRPM | DIASTOLIC BLOOD PRESSURE: 71 MMHG | HEIGHT: 63 IN

## 2024-02-13 LAB
FASTING STATUS PATIENT QL REPORTED: YES
GLUCOSE SERPL-MCNC: 118 MG/DL (ref 70–99)
HGB BLD-MCNC: 9.9 G/DL (ref 11.7–15.7)

## 2024-02-13 PROCEDURE — 250N000013 HC RX MED GY IP 250 OP 250 PS 637: Performed by: ORTHOPAEDIC SURGERY

## 2024-02-13 PROCEDURE — 97530 THERAPEUTIC ACTIVITIES: CPT | Mod: GP | Performed by: PHYSICAL THERAPY ASSISTANT

## 2024-02-13 PROCEDURE — 36415 COLL VENOUS BLD VENIPUNCTURE: CPT | Performed by: ORTHOPAEDIC SURGERY

## 2024-02-13 PROCEDURE — 258N000003 HC RX IP 258 OP 636: Performed by: ORTHOPAEDIC SURGERY

## 2024-02-13 PROCEDURE — 97110 THERAPEUTIC EXERCISES: CPT | Mod: GP | Performed by: PHYSICAL THERAPY ASSISTANT

## 2024-02-13 PROCEDURE — 250N000011 HC RX IP 250 OP 636: Performed by: ORTHOPAEDIC SURGERY

## 2024-02-13 PROCEDURE — 82947 ASSAY GLUCOSE BLOOD QUANT: CPT | Performed by: ORTHOPAEDIC SURGERY

## 2024-02-13 PROCEDURE — 85018 HEMOGLOBIN: CPT | Performed by: ORTHOPAEDIC SURGERY

## 2024-02-13 PROCEDURE — 97165 OT EVAL LOW COMPLEX 30 MIN: CPT | Mod: GO | Performed by: OCCUPATIONAL THERAPIST

## 2024-02-13 PROCEDURE — 97535 SELF CARE MNGMENT TRAINING: CPT | Mod: GO | Performed by: OCCUPATIONAL THERAPIST

## 2024-02-13 PROCEDURE — 97116 GAIT TRAINING THERAPY: CPT | Mod: GP | Performed by: PHYSICAL THERAPY ASSISTANT

## 2024-02-13 RX ADMIN — ASPIRIN 325 MG: 325 TABLET, COATED ORAL at 08:03

## 2024-02-13 RX ADMIN — OXYCODONE HYDROCHLORIDE 5 MG: 5 TABLET ORAL at 08:04

## 2024-02-13 RX ADMIN — CEFAZOLIN SODIUM 2 G: 2 INJECTION, SOLUTION INTRAVENOUS at 02:22

## 2024-02-13 RX ADMIN — SODIUM CHLORIDE, POTASSIUM CHLORIDE, SODIUM LACTATE AND CALCIUM CHLORIDE: 600; 310; 30; 20 INJECTION, SOLUTION INTRAVENOUS at 00:17

## 2024-02-13 RX ADMIN — POLYETHYLENE GLYCOL 3350 17 G: 17 POWDER, FOR SOLUTION ORAL at 08:02

## 2024-02-13 RX ADMIN — ACETAMINOPHEN 975 MG: 325 TABLET, FILM COATED ORAL at 06:56

## 2024-02-13 RX ADMIN — Medication 1 TABLET: at 08:03

## 2024-02-13 RX ADMIN — LEVOTHYROXINE SODIUM 75 MCG: 75 TABLET ORAL at 08:03

## 2024-02-13 ASSESSMENT — ACTIVITIES OF DAILY LIVING (ADL)
ADLS_ACUITY_SCORE: 28
ADLS_ACUITY_SCORE: 26

## 2024-02-13 NOTE — PLAN OF CARE
Goal Outcome Evaluation:    Pt. A&Ox4 , Ast x1 GB and walker, WBAT, regular diet, VSS except elevated BP, denies SOB, denies chest pain, CMS intact, Dressing C/D/I, pain managed by schedule tylenol, plan of care on going.

## 2024-02-13 NOTE — PLAN OF CARE
Goal Outcome Evaluation:    DATE & TIME:2/12/[id number]  Cognitive Concerns/ Orientation:A/O x4  BEHAVIOR & AGGRESSION TOOL COLOR:Green, calm and cooperative  ABNL VS/O2:VSS on RA ex: htn  MOBILITY:Ax1 with gb/w  PAIN MANAGMENT:Denies  DIET:Regular  BOWEL/BLADDER:Continent of B/B  DRAIN/DEVICES:L Piv infusing LR  TELEMETRY RHYTHM:n/a  SKIN: R hip surgical incision-CDI  TESTS/PROCEDURES:n/a  D/C DATE:  Pending

## 2024-02-13 NOTE — PROGRESS NOTES
"POD# 1    SUBJECTIVE  Pain well managed  Mobility improving    OBJECTIVE:   BP (!) 142/72 (BP Location: Left arm)   Pulse 100   Temp 98.2  F (36.8  C) (Oral)   Resp 18   Ht 1.6 m (5' 3\")   Wt 75.8 kg (167 lb)   SpO2 98%   BMI 29.58 kg/m     Hemoglobin   Date Value Ref Range Status   11/01/2014 14.1 11.7 - 15.7 g/dL Final   ]   Wound: Dressing dry  Hgb pending    ASSESSMENT   Stable    PLAN   Mobilize in PT  Home later today  Follow up 2 weeks post-op    Cem Lopez MD   "

## 2024-02-13 NOTE — PROGRESS NOTES
2/12/24; [phone]    POD #0 R Direct Anterior Total Hip Arthroplasty     A&O X4; A1 gb/walker; HTN on RA; pain managed w/ Atarax and Oxy given X1; patient has voided but is retaining; PT has seen the patient.

## 2024-02-13 NOTE — PROGRESS NOTES
02/13/24 0824   Appointment Info   Signing Clinician's Name / Credentials (OT) Lisa Host, OTR/L   Quick Adds   Quick Adds Certification   Living Environment   People in Home alone   Current Living Arrangements condominium   Home Accessibility stairs to enter home   Number of Stairs, Main Entrance greater than 10 stairs   Stair Railings, Main Entrance railing on right side (ascending)   Transportation Anticipated family or friend will provide   Living Environment Comments Pt lives alone in condo, will have friend come pick her up from hospital and stay with her for at least a few days and longer if needed for assist. Walk in shower w/ grab bars.   Self-Care   Usual Activity Tolerance good   Current Activity Tolerance moderate   Equipment Currently Used at Home grab bar, tub/shower;raised toilet seat   Fall history within last six months no   Activity/Exercise/Self-Care Comment Independent w/ all ADLs/IADLs at baseline, no AD. Has SPC and FWW available at home.   General Information   Onset of Illness/Injury or Date of Surgery 02/12/24   Referring Physician Cem Lopez MD   Patient/Family Therapy Goal Statement (OT) Return home   Additional Occupational Profile Info/Pertinent History of Current Problem s/p R anterior DEVORAH   Existing Precautions/Restrictions fall   Right Lower Extremity (Weight-bearing Status) weight-bearing as tolerated (WBAT)   Cognitive Status Examination   Orientation Status orientation to person, place and time   Affect/Mental Status (Cognitive) WNL   Visual Perception   Visual Impairment/Limitations corrective lenses full-time   Pain Assessment   Patient Currently in Pain Yes, see Vital Sign flowsheet   Range of Motion Comprehensive   General Range of Motion bilateral upper extremity ROM WNL   Strength Comprehensive (MMT)   General Manual Muscle Testing (MMT) Assessment no strength deficits identified   Bed Mobility   Bed Mobility supine-sit   Supine-Sit [place] (Bed Mobility)  contact guard   Transfers   Transfers sit-stand transfer   Sit-Stand Transfer   Sit-Stand [place] (Transfers) contact guard   Assistive Device (Sit-Stand Transfers) walker, front-wheeled   Activities of Daily Living   BADL Assessment/Intervention lower body dressing;toileting   Lower Body Dressing Assessment/Training   [place] Level (Lower Body Dressing) contact guard assist   Toileting   [place] Level (Toileting) contact guard assist   Clinical Impression   Criteria for Skilled Therapeutic Interventions Met (OT) Yes, treatment indicated   OT Diagnosis decreased ADL independence   OT Problem List-Impairments impacting ADL problems related to;activity tolerance impaired;pain;mobility   Assessment of Occupational Performance 1-3 Performance Deficits   Identified Performance Deficits dressing, toileting, functional transfers   Planned Therapy Interventions (OT) ADL retraining;transfer training;home program guidelines   Clinical Decision Making Complexity (OT) problem focused assessment/low complexity   Risk & Benefits of therapy have been explained patient   OT Total Evaluation Time   OT Eval, Low Complexity Minutes (65306) 10   Therapy Certification   Medical Diagnosis s/p R ant DEVORAH   Start of Care Date 02/13/24   Certification date from 02/13/24   Certification date to 02/13/24   OT Goals   Therapy Frequency (OT) One time eval and treatment   OT Predicted Duration/Target Date for Goal Attainment 02/13/24   OT Goals Toilet Transfer/Toileting;Transfers;Lower Body Dressing   OT: Lower Body Dressing Modified independent   OT: Transfer Modified independent   OT: Toilet Transfer/Toileting Modified independent   Interventions   Interventions Quick Adds Self-Care/Home Management   Self-Care/Home Management   Self-Care/Home Mgmt/ADL, Compensatory, Meal Prep Minutes (01425) 15   Symptoms Noted During/After Treatment (Meal Preparation/Planning Training) fatigue;increased pain   Treatment Detail/Skilled  Intervention Pt ed on DEVORAH recovery and ADL implications. Pt receptive and verbalized understanding. José Miguel'ed underwear, pants, socks and shoes w/ mod I after ed. Ed on safe transfers from bed, chair, toilet, walk in shower. Pt performed w/ mod I and FWW after ed. Demo'ed understanding of learned techniques. Pt ed on AE recommendations for shower chair and resources to obtain/purchase. Pt reports she will purchase via [employer] if she feels she needs. Pt left in chair at end of session w/ all needs. All OT questions/concerns addressed.   OT Discharge Planning   OT Discharge Recommendation (DC Rec)   (defer to ortho)   OT Rationale for DC Rec Pt slightly below baseline for ADL performance s/p R anterior DEVORAH. Limited by decreased activity oliva, pain. Progressed to mod I during session. Pt reports friend will be staying with her at d/c to assist as needed.   OT Brief overview of current status mod I w/ FWW   OT Equipment Needed at Discharge shower chair   Total Session Time   Timed Code Treatment Minutes 15   Total Session Time (sum of timed and untimed services) 25        [facility]  OUTPATIENT OCCUPATIONAL THERAPY  EVALUATION  PLAN OF TREATMENT FOR OUTPATIENT REHABILITATION  (COMPLETE FOR INITIAL CLAIMS ONLY)  Patient's Last Name, First Name, M.I.  YOB: 1950  Renetta Bender                          Provider's Name  [facility] Medical Record No.  [id number]                             Onset Date:  02/12/24   Start of Care Date:  02/13/24   Type:     ___PT   _X_OT   ___SLP Medical Diagnosis:  s/p R ant DEVORAH                    OT Diagnosis:  decreased ADL independence Visits from SOC:  1     See note for plan of treatment, functional goals and certification details    I CERTIFY THE NEED FOR THESE SERVICES FURNISHED UNDER        THIS PLAN OF TREATMENT AND WHILE UNDER MY CARE     (Physician co-signature of this document indicates review and  certification of the therapy plan).

## 2024-02-13 NOTE — PROGRESS NOTES
Vss, cms intact. Up with SBA and walker. Pain managed with oxycodone and tylenol. Dressing C/D/I. Reviewed AVS with teach back. Discharge medications and instruction sheet given. A&OX4. Discharge home today- left floor via wheelchair.

## 2024-02-21 ENCOUNTER — DOCUMENTATION ONLY (OUTPATIENT)
Dept: OTHER | Facility: CLINIC | Age: 74
End: 2024-02-21
Payer: COMMERCIAL

## 2024-03-27 ENCOUNTER — MEDICAL CORRESPONDENCE (OUTPATIENT)
Dept: HEALTH INFORMATION MANAGEMENT | Facility: CLINIC | Age: 74
End: 2024-03-27

## 2024-05-03 RX ORDER — SENNOSIDES 8.6 MG
650 CAPSULE ORAL DAILY PRN
Status: ON HOLD | COMMUNITY
End: 2024-05-06

## 2024-05-03 RX ORDER — AZITHROMYCIN 500 MG/1
500 TABLET, FILM COATED ORAL
COMMUNITY
End: 2024-09-26

## 2024-05-03 NOTE — PROGRESS NOTES
PTA medications updated by Medication Scribe prior to surgery via phone call with patient (last doses completed by Nurse)     Medication history sources: Patient, Surescripts, H&P, and Patient's home med list  In the past week, patient estimated taking medication this percent of the time: Greater than 90%      Significant changes made to the medication list:  Patient reports no longer taking the following meds (med scribe removed from PTA med list): Aspirin, Atarax, Oxycodone, Senokot-S      Additional medication history information:   None    Medication reconciliation completed by provider prior to medication history? No    Time spent in this activity: 25 minutes    The information provided in this note is only as accurate as the sources available at the time of update(s)      Prior to Admission medications    Medication Sig Last Dose Taking? Auth Provider Long Term End Date   acetaminophen (TYLENOL 8 HOUR) 650 MG CR tablet Take 650 mg by mouth daily as needed for mild pain 5/1/2024 at prn Yes Reported, Patient     levothyroxine (SYNTHROID, LEVOTHROID) 75 MCG tablet Take by mouth daily  at am Yes Reported, Patient Yes    multivitamin, therapeutic with minerals (MULTI-VITAMIN) TABS Take 1 tablet by mouth daily 5/1/2024 at am Yes Reported, Patient     azithromycin (ZITHROMAX) 500 MG tablet Take 500 mg by mouth once as needed (dental procedure)   Reported, Patient             Medication history completed by: Sandoval Asencio

## 2024-05-06 ENCOUNTER — ANESTHESIA EVENT (OUTPATIENT)
Dept: SURGERY | Facility: CLINIC | Age: 74
End: 2024-05-06
Payer: COMMERCIAL

## 2024-05-06 ENCOUNTER — HOSPITAL ENCOUNTER (OUTPATIENT)
Facility: CLINIC | Age: 74
Discharge: HOME | End: 2024-05-07
Attending: ORTHOPAEDIC SURGERY | Admitting: ORTHOPAEDIC SURGERY
Payer: COMMERCIAL

## 2024-05-06 ENCOUNTER — ANESTHESIA (OUTPATIENT)
Dept: SURGERY | Facility: CLINIC | Age: 74
End: 2024-05-06
Payer: COMMERCIAL

## 2024-05-06 ENCOUNTER — APPOINTMENT (OUTPATIENT)
Dept: PHYSICAL THERAPY | Facility: CLINIC | Age: 74
End: 2024-05-06
Attending: ORTHOPAEDIC SURGERY
Payer: COMMERCIAL

## 2024-05-06 ENCOUNTER — APPOINTMENT (OUTPATIENT)
Dept: GENERAL RADIOLOGY | Facility: CLINIC | Age: 74
End: 2024-05-06
Attending: ORTHOPAEDIC SURGERY
Payer: COMMERCIAL

## 2024-05-06 DIAGNOSIS — Z96.652 STATUS POST TOTAL LEFT KNEE REPLACEMENT: Primary | ICD-10-CM

## 2024-05-06 DIAGNOSIS — Z96.652 S/P TOTAL KNEE ARTHROPLASTY, LEFT: ICD-10-CM

## 2024-05-06 PROCEDURE — 272N000001 HC OR GENERAL SUPPLY STERILE: Performed by: ORTHOPAEDIC SURGERY

## 2024-05-06 PROCEDURE — 370N000017 HC ANESTHESIA TECHNICAL FEE, PER MIN: Performed by: ORTHOPAEDIC SURGERY

## 2024-05-06 PROCEDURE — 250N000011 HC RX IP 250 OP 636: Performed by: STUDENT IN AN ORGANIZED HEALTH CARE EDUCATION/TRAINING PROGRAM

## 2024-05-06 PROCEDURE — 258N000003 HC RX IP 258 OP 636: Performed by: ORTHOPAEDIC SURGERY

## 2024-05-06 PROCEDURE — 250N000011 HC RX IP 250 OP 636: Performed by: NURSE ANESTHETIST, CERTIFIED REGISTERED

## 2024-05-06 PROCEDURE — 97161 PT EVAL LOW COMPLEX 20 MIN: CPT | Mod: GP

## 2024-05-06 PROCEDURE — 250N000009 HC RX 250: Performed by: ORTHOPAEDIC SURGERY

## 2024-05-06 PROCEDURE — 710N000009 HC RECOVERY PHASE 1, LEVEL 1, PER MIN: Performed by: ORTHOPAEDIC SURGERY

## 2024-05-06 PROCEDURE — 250N000011 HC RX IP 250 OP 636: Performed by: ORTHOPAEDIC SURGERY

## 2024-05-06 PROCEDURE — 250N000013 HC RX MED GY IP 250 OP 250 PS 637: Performed by: ORTHOPAEDIC SURGERY

## 2024-05-06 PROCEDURE — 999N000141 HC STATISTIC PRE-PROCEDURE NURSING ASSESSMENT: Performed by: ORTHOPAEDIC SURGERY

## 2024-05-06 PROCEDURE — 27447 TOTAL KNEE ARTHROPLASTY: CPT | Performed by: NURSE ANESTHETIST, CERTIFIED REGISTERED

## 2024-05-06 PROCEDURE — 999N000065 XR KNEE PORT LEFT 1/2 VIEWS: Mod: LT

## 2024-05-06 PROCEDURE — 250N000009 HC RX 250: Performed by: NURSE ANESTHETIST, CERTIFIED REGISTERED

## 2024-05-06 PROCEDURE — C1776 JOINT DEVICE (IMPLANTABLE): HCPCS | Performed by: ORTHOPAEDIC SURGERY

## 2024-05-06 PROCEDURE — 258N000003 HC RX IP 258 OP 636: Performed by: ANESTHESIOLOGY

## 2024-05-06 PROCEDURE — 99100 ANES PT EXTEME AGE<1 YR&>70: CPT | Performed by: NURSE ANESTHETIST, CERTIFIED REGISTERED

## 2024-05-06 PROCEDURE — 27447 TOTAL KNEE ARTHROPLASTY: CPT | Performed by: STUDENT IN AN ORGANIZED HEALTH CARE EDUCATION/TRAINING PROGRAM

## 2024-05-06 PROCEDURE — 97116 GAIT TRAINING THERAPY: CPT | Mod: GP

## 2024-05-06 PROCEDURE — 250N000025 HC SEVOFLURANE, PER MIN: Performed by: ORTHOPAEDIC SURGERY

## 2024-05-06 PROCEDURE — C1713 ANCHOR/SCREW BN/BN,TIS/BN: HCPCS | Performed by: ORTHOPAEDIC SURGERY

## 2024-05-06 PROCEDURE — 250N000009 HC RX 250: Performed by: STUDENT IN AN ORGANIZED HEALTH CARE EDUCATION/TRAINING PROGRAM

## 2024-05-06 PROCEDURE — 360N000077 HC SURGERY LEVEL 4, PER MIN: Performed by: ORTHOPAEDIC SURGERY

## 2024-05-06 PROCEDURE — 258N000001 HC RX 258: Performed by: ORTHOPAEDIC SURGERY

## 2024-05-06 PROCEDURE — 97110 THERAPEUTIC EXERCISES: CPT | Mod: GP

## 2024-05-06 DEVICE — IMPLANTABLE DEVICE
Type: IMPLANTABLE DEVICE | Site: KNEE | Status: FUNCTIONAL
Brand: PERSONA® NATURAL TIBIA®

## 2024-05-06 DEVICE — IMPLANTABLE DEVICE
Type: IMPLANTABLE DEVICE | Site: KNEE | Status: FUNCTIONAL
Brand: PERSONA® VIVACIT-E®

## 2024-05-06 DEVICE — IMPLANTABLE DEVICE
Type: IMPLANTABLE DEVICE | Site: KNEE | Status: FUNCTIONAL
Brand: PERSONA®

## 2024-05-06 DEVICE — BONE CEMENT SIMPLEX FULL DOSE 6191-1-001: Type: IMPLANTABLE DEVICE | Site: KNEE | Status: FUNCTIONAL

## 2024-05-06 RX ORDER — PROCHLORPERAZINE MALEATE 5 MG
5 TABLET ORAL EVERY 6 HOURS PRN
Status: DISCONTINUED | OUTPATIENT
Start: 2024-05-06 | End: 2024-05-07 | Stop reason: HOSPADM

## 2024-05-06 RX ORDER — LIDOCAINE 40 MG/G
CREAM TOPICAL
Status: DISCONTINUED | OUTPATIENT
Start: 2024-05-06 | End: 2024-05-07 | Stop reason: HOSPADM

## 2024-05-06 RX ORDER — ONDANSETRON 2 MG/ML
INJECTION INTRAMUSCULAR; INTRAVENOUS PRN
Status: DISCONTINUED | OUTPATIENT
Start: 2024-05-06 | End: 2024-05-06

## 2024-05-06 RX ORDER — HYDROMORPHONE HCL IN WATER/PF 6 MG/30 ML
0.2 PATIENT CONTROLLED ANALGESIA SYRINGE INTRAVENOUS
Status: DISCONTINUED | OUTPATIENT
Start: 2024-05-06 | End: 2024-05-07 | Stop reason: HOSPADM

## 2024-05-06 RX ORDER — ACETAMINOPHEN 325 MG/1
650 TABLET ORAL EVERY 4 HOURS PRN
Qty: 100 TABLET | Refills: 0 | Status: SHIPPED | OUTPATIENT
Start: 2024-05-06 | End: 2024-09-26

## 2024-05-06 RX ORDER — HYDROMORPHONE HCL IN WATER/PF 6 MG/30 ML
0.4 PATIENT CONTROLLED ANALGESIA SYRINGE INTRAVENOUS
Status: DISCONTINUED | OUTPATIENT
Start: 2024-05-06 | End: 2024-05-07 | Stop reason: HOSPADM

## 2024-05-06 RX ORDER — FENTANYL CITRATE 50 UG/ML
50 INJECTION, SOLUTION INTRAMUSCULAR; INTRAVENOUS EVERY 5 MIN PRN
Status: DISCONTINUED | OUTPATIENT
Start: 2024-05-06 | End: 2024-05-06 | Stop reason: HOSPADM

## 2024-05-06 RX ORDER — ONDANSETRON 4 MG/1
4 TABLET, ORALLY DISINTEGRATING ORAL EVERY 6 HOURS PRN
Status: DISCONTINUED | OUTPATIENT
Start: 2024-05-06 | End: 2024-05-07 | Stop reason: HOSPADM

## 2024-05-06 RX ORDER — PROPOFOL 10 MG/ML
INJECTION, EMULSION INTRAVENOUS PRN
Status: DISCONTINUED | OUTPATIENT
Start: 2024-05-06 | End: 2024-05-06

## 2024-05-06 RX ORDER — POLYETHYLENE GLYCOL 3350 17 G/17G
17 POWDER, FOR SOLUTION ORAL DAILY
Status: DISCONTINUED | OUTPATIENT
Start: 2024-05-07 | End: 2024-05-07 | Stop reason: HOSPADM

## 2024-05-06 RX ORDER — LIDOCAINE HYDROCHLORIDE 20 MG/ML
INJECTION, SOLUTION INFILTRATION; PERINEURAL PRN
Status: DISCONTINUED | OUTPATIENT
Start: 2024-05-06 | End: 2024-05-06

## 2024-05-06 RX ORDER — HYDROXYZINE HYDROCHLORIDE 10 MG/1
10 TABLET, FILM COATED ORAL EVERY 6 HOURS PRN
Status: DISCONTINUED | OUTPATIENT
Start: 2024-05-06 | End: 2024-05-07 | Stop reason: HOSPADM

## 2024-05-06 RX ORDER — LEVOTHYROXINE SODIUM 75 UG/1
75 TABLET ORAL
Status: DISCONTINUED | OUTPATIENT
Start: 2024-05-07 | End: 2024-05-07 | Stop reason: HOSPADM

## 2024-05-06 RX ORDER — CEFAZOLIN SODIUM 1 G/3ML
1 INJECTION, POWDER, FOR SOLUTION INTRAMUSCULAR; INTRAVENOUS EVERY 8 HOURS
Qty: 10 ML | Refills: 0 | Status: COMPLETED | OUTPATIENT
Start: 2024-05-06 | End: 2024-05-06

## 2024-05-06 RX ORDER — OXYCODONE HYDROCHLORIDE 5 MG/1
5-10 TABLET ORAL EVERY 4 HOURS PRN
Qty: 30 TABLET | Refills: 0 | Status: SHIPPED | OUTPATIENT
Start: 2024-05-06 | End: 2024-05-07

## 2024-05-06 RX ORDER — DEXAMETHASONE SODIUM PHOSPHATE 4 MG/ML
4 INJECTION, SOLUTION INTRA-ARTICULAR; INTRALESIONAL; INTRAMUSCULAR; INTRAVENOUS; SOFT TISSUE
Status: DISCONTINUED | OUTPATIENT
Start: 2024-05-06 | End: 2024-05-06 | Stop reason: HOSPADM

## 2024-05-06 RX ORDER — DIPHENHYDRAMINE HCL 12.5MG/5ML
12.5 LIQUID (ML) ORAL EVERY 6 HOURS PRN
Status: DISCONTINUED | OUTPATIENT
Start: 2024-05-06 | End: 2024-05-07 | Stop reason: HOSPADM

## 2024-05-06 RX ORDER — ONDANSETRON 2 MG/ML
4 INJECTION INTRAMUSCULAR; INTRAVENOUS EVERY 6 HOURS PRN
Status: DISCONTINUED | OUTPATIENT
Start: 2024-05-06 | End: 2024-05-07 | Stop reason: HOSPADM

## 2024-05-06 RX ORDER — CEFAZOLIN SODIUM/WATER 2 G/20 ML
2 SYRINGE (ML) INTRAVENOUS SEE ADMIN INSTRUCTIONS
Status: DISCONTINUED | OUTPATIENT
Start: 2024-05-06 | End: 2024-05-06 | Stop reason: HOSPADM

## 2024-05-06 RX ORDER — OXYCODONE HYDROCHLORIDE 5 MG/1
5 TABLET ORAL EVERY 4 HOURS PRN
Status: DISCONTINUED | OUTPATIENT
Start: 2024-05-06 | End: 2024-05-07 | Stop reason: HOSPADM

## 2024-05-06 RX ORDER — NALOXONE HYDROCHLORIDE 0.4 MG/ML
0.4 INJECTION, SOLUTION INTRAMUSCULAR; INTRAVENOUS; SUBCUTANEOUS
Status: DISCONTINUED | OUTPATIENT
Start: 2024-05-06 | End: 2024-05-07 | Stop reason: HOSPADM

## 2024-05-06 RX ORDER — BISACODYL 10 MG
10 SUPPOSITORY, RECTAL RECTAL DAILY PRN
Status: DISCONTINUED | OUTPATIENT
Start: 2024-05-09 | End: 2024-05-07 | Stop reason: HOSPADM

## 2024-05-06 RX ORDER — PROPOFOL 10 MG/ML
INJECTION, EMULSION INTRAVENOUS CONTINUOUS PRN
Status: DISCONTINUED | OUTPATIENT
Start: 2024-05-06 | End: 2024-05-06

## 2024-05-06 RX ORDER — FENTANYL CITRATE 50 UG/ML
25 INJECTION, SOLUTION INTRAMUSCULAR; INTRAVENOUS EVERY 5 MIN PRN
Status: DISCONTINUED | OUTPATIENT
Start: 2024-05-06 | End: 2024-05-06 | Stop reason: HOSPADM

## 2024-05-06 RX ORDER — MAGNESIUM HYDROXIDE 1200 MG/15ML
LIQUID ORAL PRN
Status: DISCONTINUED | OUTPATIENT
Start: 2024-05-06 | End: 2024-05-06 | Stop reason: HOSPADM

## 2024-05-06 RX ORDER — FENTANYL CITRATE 50 UG/ML
INJECTION, SOLUTION INTRAMUSCULAR; INTRAVENOUS PRN
Status: DISCONTINUED | OUTPATIENT
Start: 2024-05-06 | End: 2024-05-06

## 2024-05-06 RX ORDER — AMOXICILLIN 250 MG
1-2 CAPSULE ORAL 2 TIMES DAILY
Qty: 30 TABLET | Refills: 0 | Status: SHIPPED | OUTPATIENT
Start: 2024-05-06 | End: 2024-09-26

## 2024-05-06 RX ORDER — OXYCODONE HYDROCHLORIDE 5 MG/1
10 TABLET ORAL EVERY 4 HOURS PRN
Status: DISCONTINUED | OUTPATIENT
Start: 2024-05-06 | End: 2024-05-07 | Stop reason: HOSPADM

## 2024-05-06 RX ORDER — NALOXONE HYDROCHLORIDE 0.4 MG/ML
0.2 INJECTION, SOLUTION INTRAMUSCULAR; INTRAVENOUS; SUBCUTANEOUS
Status: DISCONTINUED | OUTPATIENT
Start: 2024-05-06 | End: 2024-05-07 | Stop reason: HOSPADM

## 2024-05-06 RX ORDER — ONDANSETRON 4 MG/1
4 TABLET, ORALLY DISINTEGRATING ORAL EVERY 30 MIN PRN
Status: DISCONTINUED | OUTPATIENT
Start: 2024-05-06 | End: 2024-05-06 | Stop reason: HOSPADM

## 2024-05-06 RX ORDER — TRANEXAMIC ACID 650 MG/1
1950 TABLET ORAL ONCE
Status: COMPLETED | OUTPATIENT
Start: 2024-05-06 | End: 2024-05-06

## 2024-05-06 RX ORDER — DEXAMETHASONE SODIUM PHOSPHATE 4 MG/ML
INJECTION, SOLUTION INTRA-ARTICULAR; INTRALESIONAL; INTRAMUSCULAR; INTRAVENOUS; SOFT TISSUE PRN
Status: DISCONTINUED | OUTPATIENT
Start: 2024-05-06 | End: 2024-05-06

## 2024-05-06 RX ORDER — ASPIRIN 325 MG
325 TABLET, DELAYED RELEASE (ENTERIC COATED) ORAL DAILY
Status: DISCONTINUED | OUTPATIENT
Start: 2024-05-06 | End: 2024-05-07 | Stop reason: HOSPADM

## 2024-05-06 RX ORDER — ACETAMINOPHEN 325 MG/1
650 TABLET ORAL EVERY 4 HOURS PRN
Status: DISCONTINUED | OUTPATIENT
Start: 2024-05-09 | End: 2024-05-07 | Stop reason: HOSPADM

## 2024-05-06 RX ORDER — NALOXONE HYDROCHLORIDE 0.4 MG/ML
0.1 INJECTION, SOLUTION INTRAMUSCULAR; INTRAVENOUS; SUBCUTANEOUS
Status: DISCONTINUED | OUTPATIENT
Start: 2024-05-06 | End: 2024-05-06 | Stop reason: HOSPADM

## 2024-05-06 RX ORDER — DEXMEDETOMIDINE HYDROCHLORIDE 4 UG/ML
INJECTION, SOLUTION INTRAVENOUS PRN
Status: DISCONTINUED | OUTPATIENT
Start: 2024-05-06 | End: 2024-05-06

## 2024-05-06 RX ORDER — CEFAZOLIN SODIUM/WATER 2 G/20 ML
2 SYRINGE (ML) INTRAVENOUS
Status: COMPLETED | OUTPATIENT
Start: 2024-05-06 | End: 2024-05-06

## 2024-05-06 RX ORDER — HYDROMORPHONE HCL IN WATER/PF 6 MG/30 ML
0.2 PATIENT CONTROLLED ANALGESIA SYRINGE INTRAVENOUS EVERY 5 MIN PRN
Status: DISCONTINUED | OUTPATIENT
Start: 2024-05-06 | End: 2024-05-06 | Stop reason: HOSPADM

## 2024-05-06 RX ORDER — ACETAMINOPHEN 325 MG/1
975 TABLET ORAL EVERY 8 HOURS
Qty: 27 TABLET | Refills: 0 | Status: DISCONTINUED | OUTPATIENT
Start: 2024-05-06 | End: 2024-05-07 | Stop reason: HOSPADM

## 2024-05-06 RX ORDER — HYDROXYZINE HYDROCHLORIDE 10 MG/1
10 TABLET, FILM COATED ORAL EVERY 6 HOURS PRN
Qty: 30 TABLET | Refills: 0 | Status: SHIPPED | OUTPATIENT
Start: 2024-05-06 | End: 2024-05-07

## 2024-05-06 RX ORDER — HYDROMORPHONE HCL IN WATER/PF 6 MG/30 ML
0.4 PATIENT CONTROLLED ANALGESIA SYRINGE INTRAVENOUS EVERY 5 MIN PRN
Status: DISCONTINUED | OUTPATIENT
Start: 2024-05-06 | End: 2024-05-06 | Stop reason: HOSPADM

## 2024-05-06 RX ORDER — ASPIRIN 325 MG
325 TABLET, DELAYED RELEASE (ENTERIC COATED) ORAL DAILY
Qty: 30 TABLET | Refills: 0 | Status: SHIPPED | OUTPATIENT
Start: 2024-05-06 | End: 2024-09-26

## 2024-05-06 RX ORDER — SODIUM CHLORIDE, SODIUM LACTATE, POTASSIUM CHLORIDE, CALCIUM CHLORIDE 600; 310; 30; 20 MG/100ML; MG/100ML; MG/100ML; MG/100ML
INJECTION, SOLUTION INTRAVENOUS CONTINUOUS
Status: DISCONTINUED | OUTPATIENT
Start: 2024-05-06 | End: 2024-05-06 | Stop reason: HOSPADM

## 2024-05-06 RX ORDER — AMOXICILLIN 250 MG
1 CAPSULE ORAL 2 TIMES DAILY
Status: DISCONTINUED | OUTPATIENT
Start: 2024-05-06 | End: 2024-05-07 | Stop reason: HOSPADM

## 2024-05-06 RX ORDER — MULTIPLE VITAMINS W/ MINERALS TAB 9MG-400MCG
1 TAB ORAL
Status: DISCONTINUED | OUTPATIENT
Start: 2024-05-06 | End: 2024-05-07 | Stop reason: HOSPADM

## 2024-05-06 RX ORDER — ONDANSETRON 2 MG/ML
4 INJECTION INTRAMUSCULAR; INTRAVENOUS EVERY 30 MIN PRN
Status: DISCONTINUED | OUTPATIENT
Start: 2024-05-06 | End: 2024-05-06 | Stop reason: HOSPADM

## 2024-05-06 RX ORDER — HYDROMORPHONE HYDROCHLORIDE 1 MG/ML
INJECTION, SOLUTION INTRAMUSCULAR; INTRAVENOUS; SUBCUTANEOUS PRN
Status: DISCONTINUED | OUTPATIENT
Start: 2024-05-06 | End: 2024-05-06

## 2024-05-06 RX ORDER — SODIUM CHLORIDE, SODIUM LACTATE, POTASSIUM CHLORIDE, CALCIUM CHLORIDE 600; 310; 30; 20 MG/100ML; MG/100ML; MG/100ML; MG/100ML
INJECTION, SOLUTION INTRAVENOUS CONTINUOUS
Status: DISCONTINUED | OUTPATIENT
Start: 2024-05-06 | End: 2024-05-07 | Stop reason: HOSPADM

## 2024-05-06 RX ORDER — METHOCARBAMOL 500 MG/1
500 TABLET, FILM COATED ORAL EVERY 6 HOURS PRN
Status: DISCONTINUED | OUTPATIENT
Start: 2024-05-06 | End: 2024-05-07 | Stop reason: HOSPADM

## 2024-05-06 RX ADMIN — DEXMEDETOMIDINE HYDROCHLORIDE 12 MCG: 200 INJECTION INTRAVENOUS at 08:18

## 2024-05-06 RX ADMIN — CEFAZOLIN 1 G: 1 INJECTION, POWDER, FOR SOLUTION INTRAMUSCULAR; INTRAVENOUS at 22:36

## 2024-05-06 RX ADMIN — SUGAMMADEX 200 MG: 100 INJECTION, SOLUTION INTRAVENOUS at 09:32

## 2024-05-06 RX ADMIN — TRANEXAMIC ACID 1950 MG: 650 TABLET ORAL at 06:17

## 2024-05-06 RX ADMIN — ONDANSETRON 4 MG: 2 INJECTION INTRAMUSCULAR; INTRAVENOUS at 09:09

## 2024-05-06 RX ADMIN — Medication 2 G: at 07:29

## 2024-05-06 RX ADMIN — FENTANYL CITRATE 50 MCG: 50 INJECTION INTRAMUSCULAR; INTRAVENOUS at 07:29

## 2024-05-06 RX ADMIN — ASPIRIN 325 MG: 325 TABLET, COATED ORAL at 15:39

## 2024-05-06 RX ADMIN — FENTANYL CITRATE 25 MCG: 50 INJECTION, SOLUTION INTRAMUSCULAR; INTRAVENOUS at 10:10

## 2024-05-06 RX ADMIN — HYDROMORPHONE HYDROCHLORIDE 0.5 MG: 1 INJECTION, SOLUTION INTRAMUSCULAR; INTRAVENOUS; SUBCUTANEOUS at 07:58

## 2024-05-06 RX ADMIN — ACETAMINOPHEN 975 MG: 325 TABLET, FILM COATED ORAL at 15:39

## 2024-05-06 RX ADMIN — ROCURONIUM BROMIDE 50 MG: 50 INJECTION, SOLUTION INTRAVENOUS at 07:33

## 2024-05-06 RX ADMIN — HYDROMORPHONE HYDROCHLORIDE 0.5 MG: 1 INJECTION, SOLUTION INTRAMUSCULAR; INTRAVENOUS; SUBCUTANEOUS at 08:11

## 2024-05-06 RX ADMIN — SODIUM CHLORIDE, POTASSIUM CHLORIDE, SODIUM LACTATE AND CALCIUM CHLORIDE: 600; 310; 30; 20 INJECTION, SOLUTION INTRAVENOUS at 07:02

## 2024-05-06 RX ADMIN — SODIUM CHLORIDE, POTASSIUM CHLORIDE, SODIUM LACTATE AND CALCIUM CHLORIDE: 600; 310; 30; 20 INJECTION, SOLUTION INTRAVENOUS at 18:16

## 2024-05-06 RX ADMIN — PROPOFOL 25 MCG/KG/MIN: 10 INJECTION, EMULSION INTRAVENOUS at 07:33

## 2024-05-06 RX ADMIN — LIDOCAINE HYDROCHLORIDE 40 MG: 20 INJECTION, SOLUTION INFILTRATION; PERINEURAL at 07:33

## 2024-05-06 RX ADMIN — ONDANSETRON 4 MG: 2 INJECTION INTRAMUSCULAR; INTRAVENOUS at 10:25

## 2024-05-06 RX ADMIN — CEFAZOLIN 1 G: 1 INJECTION, POWDER, FOR SOLUTION INTRAMUSCULAR; INTRAVENOUS at 15:40

## 2024-05-06 RX ADMIN — SODIUM CHLORIDE, POTASSIUM CHLORIDE, SODIUM LACTATE AND CALCIUM CHLORIDE: 600; 310; 30; 20 INJECTION, SOLUTION INTRAVENOUS at 12:14

## 2024-05-06 RX ADMIN — DEXAMETHASONE SODIUM PHOSPHATE 10 MG: 4 INJECTION, SOLUTION INTRA-ARTICULAR; INTRALESIONAL; INTRAMUSCULAR; INTRAVENOUS; SOFT TISSUE at 07:35

## 2024-05-06 RX ADMIN — ACETAMINOPHEN 975 MG: 325 TABLET, FILM COATED ORAL at 22:36

## 2024-05-06 RX ADMIN — PROPOFOL 150 MG: 10 INJECTION, EMULSION INTRAVENOUS at 07:33

## 2024-05-06 RX ADMIN — PROCHLORPERAZINE EDISYLATE 5 MG: 5 INJECTION INTRAMUSCULAR; INTRAVENOUS at 12:36

## 2024-05-06 RX ADMIN — BUPIVACAINE HYDROCHLORIDE 15 ML: 5 INJECTION, SOLUTION EPIDURAL; INTRACAUDAL at 07:00

## 2024-05-06 RX ADMIN — FENTANYL CITRATE 50 MCG: 50 INJECTION INTRAMUSCULAR; INTRAVENOUS at 07:33

## 2024-05-06 ASSESSMENT — ACTIVITIES OF DAILY LIVING (ADL)
ADLS_ACUITY_SCORE: 38
ADLS_ACUITY_SCORE: 35
ADLS_ACUITY_SCORE: 38
ADLS_ACUITY_SCORE: 35
ADLS_ACUITY_SCORE: 35
ADLS_ACUITY_SCORE: 38
ADLS_ACUITY_SCORE: 35
ADLS_ACUITY_SCORE: 39
ADLS_ACUITY_SCORE: 38
ADLS_ACUITY_SCORE: 38
ADLS_ACUITY_SCORE: 36
ADLS_ACUITY_SCORE: 35
ADLS_ACUITY_SCORE: 38
ADLS_ACUITY_SCORE: 38
ADLS_ACUITY_SCORE: 35
ADLS_ACUITY_SCORE: 35

## 2024-05-06 ASSESSMENT — COPD QUESTIONNAIRES: COPD: 0

## 2024-05-06 ASSESSMENT — LIFESTYLE VARIABLES: TOBACCO_USE: 0

## 2024-05-06 NOTE — ANESTHESIA PROCEDURE NOTES
Adductor canal (targeting saphenous nerve) Procedure Note    Pre-Procedure   Staff -        Anesthesiologist:  Brett Cruz MD       Performed By: anesthesiologist       Location: pre-op       Procedure Start/Stop Times: 5/6/2024 7:00 AM and 5/6/2024 7:02 AM       Pre-Anesthestic Checklist: patient identified, IV checked, site marked, risks and benefits discussed, informed consent, monitors and equipment checked, pre-op evaluation, at physician/surgeon's request and post-op pain management  Timeout:       Correct Patient: Yes        Correct Procedure: Yes        Correct Site: Yes        Correct Position: Yes        Correct Laterality: Yes        Site Marked: Yes  Procedure Documentation  Procedure: Adductor canal (targeting saphenous nerve)       Laterality: left       Patient Position: supine       Skin prep: Chloraprep       Local skin infiltrated with 1 mL of 1% lidocaine.        Needle Type: insulated       Needle Gauge: 21.        Needle Length (millimeters): 90        Ultrasound guided       1. Ultrasound was used to identify targeted nerve, plexus, vascular marker, or fascial plane and place a needle adjacent to it in real-time.       2. Ultrasound was used to visualize the spread of anesthetic in close proximity to the above referenced structure.       3. A permanent image is entered into the patient's record.       4. The visualized anatomic structures appeared normal.       5. There were no apparent abnormal pathologic findings.    Assessment/Narrative         The placement was negative for: blood aspirated, painful injection and site bleeding       Paresthesias: No.       Bolus given via needle..        Secured via.        Insertion/Infusion Method: Single Shot       Complications: none    Medication(s) Administered   Bupivacaine 0.5% w/ 1:400K Epi (Injection) - Injection   15 mL - 5/6/2024 7:00:00 AM  Medication Administration Time: 5/6/2024 7:00 AM     Comments:  Bolus via needle, 15 mL of 0.5%  "bupivacaine with 1:400,000 epinephrine.    Under ultrasound guidance, a 21 gauge needle was inserted and placed in close proximity to the saphenous nerve. Ultrasound was also used to visualize the spread of anesthetic in close proximity to the nerve being blocked. The nerve appeared anatomically normal, and there were no apparent abnormal pathological findings. Patient tolerated well, was mildly sedated but communicative throughout the procedure. A permanent ultrasound image was saved in the patient's record.    The surgeon has given a verbal order transferring care of this patient to me for the performance of regional analgesia block for post op pain control. It is requested of me because I am uniquely trained and qualified to perform this block and the surgeon is neither trained nor qualified to perform this procedure.         FOR [facility] ([facility]) ONLY:   Pain Team Contact information: please page the Pain Team Via [employer]. Search \"Pain\". During daytime hours, please page the attending first. At night please page the resident first.      "

## 2024-05-06 NOTE — ANESTHESIA POSTPROCEDURE EVALUATION
Patient: Renetta Bender    Procedure: Procedure(s):  Left total knee arthroplasty       Anesthesia Type:  General    Note:  Disposition: Inpatient   Postop Pain Control: Uneventful            Sign Out: Well controlled pain   PONV: No   Neuro/Psych: Uneventful            Sign Out: Acceptable/Baseline neuro status   Airway/Respiratory: Uneventful            Sign Out: Acceptable/Baseline resp. status   CV/Hemodynamics: Uneventful            Sign Out: Acceptable CV status   Other NRE: NONE   DID A NON-ROUTINE EVENT OCCUR?            Last vitals:  Vitals Value Taken Time   /75 05/06/24 1130   Temp 36.1  C (97  F) 05/06/24 1045   Pulse 60 05/06/24 1141   Resp 10 05/06/24 1141   SpO2 100 % 05/06/24 1141   Vitals shown include unfiled device data.    Electronically Signed By: Brett Cruz MD  May 6, 2024  12:37 PM

## 2024-05-06 NOTE — PLAN OF CARE
Goal Outcome Evaluation:         Patient vital signs are at baseline: Yes  Patient able to ambulate as they were prior to admission or with assist devices provided by therapies during their stay:  Yes  Patient MUST void prior to discharge:  Yes  Patient able to tolerate oral intake:  Yes  Pain has adequate pain control using Oral analgesics:  Yes  Does patient have an identified :  Yes  Has goal D/C date and time been discussed with patient:  Yes         Intermittent nausea this afternoon- managed with zofran/ compazine. Encouraged IS.

## 2024-05-06 NOTE — BRIEF OP NOTE
[facility]    Brief Operative Note    Pre-operative diagnosis: Degenerative arthritis of left knee [M17.12]  Post-operative diagnosis Same as pre-operative diagnosis    Procedure: Left total knee arthroplasty, Left - Knee    Surgeon: Surgeons and Role:     * Cem Lopez MD - Primary     * Aimee Roca - Assisting  Anesthesia: General   Estimated Blood Loss: 150 ml    Drains: None  Specimens: * No specimens in log *  Findings:   Advanced DJD with a 1 cm osteochondral defect over the lateral femoral condyle .  Complications: None.  Implants:   Implant Name Type Inv. Item Serial No.  Lot No. LRB No. Used Action   BONE CEMENT SIMPLEX FULL DOSE 6191-1-001 - [id number] Cement, Bone BONE CEMENT SIMPLEX FULL DOSE 6191-1-001  [employer] [id number] Left 2 Implanted   KNEE FEMUR CR CEMENT CCR STD SZ 6 L - [id number] Total Joint Component/Insert KNEE FEMUR CR CEMENT CCR STD SZ 6 L  [employer] [id number] Left 1 Implanted   PATELLA ALL POLY 29MM - [id number] Total Joint Component/Insert PATELLA ALL POLY 29MM  [employer] [id number] Left 1 Implanted   IMP TIBIAL ZIM PSN NP STM 5DEG SZ DL -051-01 - [id number] Total Joint Component/Insert IMP TIBIAL ZIM PSN NP STM 5DEG SZ DL -444-01  [employer] [id number] Left 1 Implanted   SURFACE ARTC 12MM PERSONA RAMÍREZ [facility] 6-7 C-D [facility] VIVACIT-E - [id number] Total Joint Component/Insert SURFACE ARTC 12MM [employer]ERSON[employer] RAM[employer]REZ CN 6-7 C-D  LT VIVACIT-E  [employer] [id number] Left 1 Implanted

## 2024-05-06 NOTE — ANESTHESIA PREPROCEDURE EVALUATION
Anesthesia Pre-Procedure Evaluation    Patient: Renetta Bender   MRN: [id number] : 1950        Procedure : Procedure(s):  Left total knee arthroplasty          Past Medical History:   Diagnosis Date    Allergic rhinitis     Anxiety     resolved    Arthritis     Diverticulitis     Gastroesophageal reflux disease without esophagitis     resolved    History of colonic polyps     Hyperlipidemia     Hypothyroidism     Lichen sclerosus of female genitalia     Vulva    Osteoarthritis of hip     Right    Osteoarthritis of left knee     Osteopenia     Seasonal allergic rhinitis     Situational depression       Past Surgical History:   Procedure Laterality Date    ARTHROPLASTY HIP ANTERIOR Right 2024    Procedure: RIGHT DIRECT ANTERIOR TOTAL HIP ARTHROPLASTY;  Surgeon: Cem Lopez MD;  Location: [facility] OR    BIOPSY/EXCISE CERVICAL LESION      benign    COLONOSCOPY  2022      Allergies   Allergen Reactions    Adhesive Tape Rash    Penicillin G Hives and Rash    Sulfa Antibiotics Rash      Social History     Tobacco Use    Smoking status: Never    Smokeless tobacco: Never   Substance Use Topics    Alcohol use: Yes     Comment: Special occasions only      Wt Readings from Last 1 Encounters:   24 74.3 kg (163 lb 12.8 oz)        Anesthesia Evaluation   Pt has not had prior anesthetic         ROS/MED HX  ENT/Pulmonary:    (-) tobacco use, asthma, COPD, sleep apnea and recent URI   Neurologic:  - neg neurologic ROS     Cardiovascular:     (+) Dyslipidemia - -   -  - -                                   (-) CAD   METS/Exercise Tolerance:     Hematologic:  - neg hematologic  ROS     Musculoskeletal:   (+)  arthritis,             GI/Hepatic: Comment: Intermittent GERD,  none recently    (+) GERD,                (-) liver disease   Renal/Genitourinary:    (-) renal disease   Endo:     (+)          thyroid problem, hypothyroidism,        (-) Type I DM and Type II DM   Psychiatric/Substance Use:     (+)  "psychiatric history anxiety and depression       Infectious Disease:       Malignancy:       Other:            Physical Exam    Airway        Mallampati: II   TM distance: > 3 FB   Neck ROM: full   Mouth opening: > 3 cm    Respiratory Devices and Support         Dental       (+) Minor Abnormalities - some fillings, tiny chips      Cardiovascular          Rhythm and rate: regular and normal     Pulmonary   pulmonary exam normal                OUTSIDE LABS:  CBC:   Lab Results   Component Value Date    WBC 9.8 11/01/2014    HGB 9.9 (L) 02/13/2024    HGB 14.1 11/01/2014    HCT 42.6 11/01/2014     11/01/2014     BMP:   Lab Results   Component Value Date     11/01/2014    POTASSIUM 4.0 11/01/2014    CHLORIDE 106 11/01/2014    CO2 26 11/01/2014    BUN 13 11/01/2014    CR 1.01 11/01/2014     (H) 02/13/2024    GLC 99 11/01/2014     COAGS: No results found for: \"PTT\", \"INR\", \"FIBR\"  POC: No results found for: \"BGM\", \"HCG\", \"HCGS\"  HEPATIC: No results found for: \"ALBUMIN\", \"PROTTOTAL\", \"ALT\", \"AST\", \"GGT\", \"ALKPHOS\", \"BILITOTAL\", \"BILIDIRECT\", \"MARIA DOLORES\"  OTHER:   Lab Results   Component Value Date    ERASMO 8.6 11/01/2014       Anesthesia Plan    ASA Status:  2    NPO Status:  NPO Appropriate    Anesthesia Type: General.     - Airway: ETT   Induction: Propofol.   Maintenance: Balanced.        Consents    Anesthesia Plan(s) and associated risks, benefits, and realistic alternatives discussed. Questions answered and patient/representative(s) expressed understanding.     - Discussed:     - Discussed with:  Patient            Postoperative Care    Pain management: Multi-modal analgesia, IV analgesics, Oral pain medications, Peripheral nerve block (Single Shot).   PONV prophylaxis: Ondansetron (or other 5HT-3), Dexamethasone or Solumedrol, Background Propofol Infusion     Comments:               Brett Cruz MD    I have reviewed the pertinent notes and labs in the chart from the past 30 days and (re)examined " "the patient.  Any updates or changes from those notes are reflected in this note.              # Overweight: Estimated body mass index is 28.12 kg/m  as calculated from the following:    Height as of this encounter: 1.626 m (5' 4\").    Weight as of this encounter: 74.3 kg (163 lb 12.8 oz).    "

## 2024-05-06 NOTE — OP NOTE
Procedure Date: 05/06/24     PREOPERATIVE DIAGNOSIS:  Degenerative arthritis of the left knee.    POSTOPERATIVE DIAGNOSIS:  Degenerative arthritis of the left knee.     PROCEDURE PERFORMED:  left total knee arthroplasty.     SURGEON:  Cem Lopez MD     FIRST ASSISTANT:  YELITZA Choi     SECOND ASSISTANT: Aimee Roca ATC     DESCRIPTION OF PROCEDURE:  The patient was brought to the operating room and given an adductor canal block.  They were then given a Spinal anesthetic.  Their left knee and left lower extremity were then prepped and draped in the usual sterile fashion.  The entire procedure was performed without the use of a tourniquet.  An incision was made over the anterior aspect of the knee.  This was carried down through the subcutaneous tissues, down to the fascia.  A standard median parapatellar approach was taken to the joint, which revealed advanced degenerative changes consistent with the pre-operative imaging studies, along with a 1 cm osteochondral defect over the lateral femoral condyle that we were not expecting to see.  This had the appearance of an area of AVN with subsequent displacement of the lesion.  The fat pad was excised.  Menisci were excised.  The ACL was excised.  A drill hole was then made in the distal femur and using the intramedullary guide set to 5 degrees from the anatomic axis, the femur was cut to accept a size 6 Persona cruciate retaining femoral component.  The tibia was cut using the external alignment guide to accept a size D Persona tibial component and the patella was cut to accept a size 29 patella.  The knee was then trialed.  The alignment appeared satisfactory.  Ligaments were stable and balanced.  Trial components were removed.  The tibia was punched.  We then injected the posterior capsule with the periarticular analgesic cocktail.  Hemostasis was obtained.  The knee was then thoroughly irrigated and dried and the real components were cemented into  place.  While the cement was hardening, we irrigated the knee with a dilute solution of Betadine.  It was allowed to sit within the knee for 3 minutes and was thoroughly irrigated from the knee with a liter of normal saline.  Once the cement had hardened, all extraneous cement was removed.  We trialed the knee, and it appeared that a 12 mm insert gave the best fit.  A real 12 mm medial congruent tibial polyethylene insert was then snapped into the tibial component.  The knee had full range of motion.  The patella tracked well.  The wound was then irrigated once again with saline.  The fascia was closed with interrupted 0 Vicryl sutures.  The skin was closed with 2-0 Vicryl subcutaneous sutures and staples.  A sterile compressive dressing was applied to the knee.  The patient was then awakened from anesthesia and transferred to postanesthesia recovery in satisfactory condition.     It should be noted that my assistants were necessary throughout the entire procedure to assist with retraction and positioning.     Cem Lopez MD

## 2024-05-06 NOTE — ANESTHESIA CARE TRANSFER NOTE
Patient: Renetta Bender    Procedure: Procedure(s):  Left total knee arthroplasty       Diagnosis: Degenerative arthritis of left knee [M17.12]  Diagnosis Additional Information: No value filed.    Anesthesia Type:   General     Note:    Oropharynx: oropharynx clear of all foreign objects  Level of Consciousness: awake  Oxygen Supplementation: face mask  Level of Supplemental Oxygen (L/min / FiO2): 10  Independent Airway: airway patency satisfactory and stable  Dentition: dentition unchanged  Vital Signs Stable: post-procedure vital signs reviewed and stable  Report to RN Given: handoff report given  Patient transferred to: PACU    Handoff Report: Identifed the Patient, Identified the Reponsible Provider, Reviewed the pertinent medical history, Discussed the surgical course, Reviewed Intra-OP anesthesia mangement and issues during anesthesia, Set expectations for post-procedure period and Allowed opportunity for questions and acknowledgement of understanding      Vitals:  Vitals Value Taken Time   BP     Temp     Pulse     Resp     SpO2         Electronically Signed By: VARINDER Campos CRNA  May 6, 2024  9:48 AM

## 2024-05-06 NOTE — ANESTHESIA PROCEDURE NOTES
Airway       Patient location during procedure: OR       Procedure Start/Stop Times: 5/6/2024 7:35 AM  Staff -        Anesthesiologist:  Nleson Purvis MD       CRNA: Lashanda Philip APRN CRNA       Performed By: CRNA  Consent for Airway        Urgency: elective  Indications and Patient Condition       Indications for airway management: vanita-procedural       Induction type:intravenous       Mask difficulty assessment: 1 - vent by mask    Final Airway Details       Final airway type: endotracheal airway       Successful airway: ETT - single  Endotracheal Airway Details        ETT size (mm): 7.0       Cuffed: yes       Cuff volume (mL): 10       Successful intubation technique: direct laryngoscopy       DL Blade Type: Torres 2       Grade View of Cords: 1       Adjucts: stylet       Position: Right       Measured from: lips       Secured at (cm): 22       Bite block used: None    Post intubation assessment        Placement verified by: capnometry, equal breath sounds and chest rise        Number of attempts at approach: 1       Number of other approaches attempted: 0       Secured with: tape       Ease of procedure: easy       Dentition: Intact and Unchanged    Medication(s) Administered   Medication Administration Time: 5/6/2024 7:35 AM

## 2024-05-06 NOTE — PLAN OF CARE
05/06/24 1627   Appointment Info   Signing Clinician's Name / Credentials (PT) Miranda Escalona DPT   Rehab Comments (PT) Had OT with DEVORAH, pt mobilizing well, no OT needs this stay pt in agreement   Quick Adds   Quick Adds Certification   Living Environment   People in Home alone   Living Environment Comments house, 15 stairs up/down, must navigate steps, has dog at home she carries down steps. Family/friend staying w/ pt at DC. Plan for HHPT per pt   Self-Care   Usual Activity Tolerance good   Current Activity Tolerance fair   Activity/Exercise/Self-Care Comment IND baseline, cane sometimes community distances. Hx DEVORAH; has FWW at home   General Information   Onset of Illness/Injury or Date of Surgery 05/06/24   Referring Physician Cem Lopez MD   Pertinent History of Current Problem (include personal factors and/or comorbidities that impact the POC) L TKA   Existing Precautions/Restrictions fall   Weight-Bearing Status - LLE weight-bearing as tolerated   Weight-Bearing Status - RLE weight-bearing as tolerated   Cognition   Affect/Mental Status (Cognition) WNL   Pain Assessment   Patient Currently in Pain   (6/10 OOB mobility)   Integumentary/Edema   Integumentary/Edema Comments ace bandage   Posture    Posture Forward head position   Range of Motion (ROM)   ROM Comment LLE WFL   Strength (Manual Muscle Testing)   Strength Comments Able to perform LLE SLR   Bed Mobility   Comment, (Bed Mobility) CGA   Transfers   Comment, (Transfers) STS FWW CGA   Gait/Stairs (Locomotion)   [place] Level (Gait) contact guard   Assistive Device (Gait) walker, front-wheeled   Distance in Feet (Gait) 5' eval 100' treatment   Comment, (Gait/Stairs) CGA FWW, step to pattern, downward gaze   Balance   Balance Comments good dynamic balance use of FWW   Sensory Examination   Sensory Perception Comments intact to light touch   Clinical Impression   Criteria for Skilled Therapeutic Intervention Yes, treatment indicated   PT  Diagnosis (PT) impaired IND wiht mobility from baseline   Influenced by the following impairments functional weakness, pain, impaired balance, impaired activity tolerance   Functional limitations due to impairments impaired IND with mobility from baseline   Clinical Presentation (PT Evaluation Complexity) stable   Clinical Presentation Rationale clinical judgement   Clinical Decision Making (Complexity) low complexity   Planned Therapy Interventions (PT) balance training;bed mobility training;cryotherapy;gait training;home exercise program;patient/family education;ROM (range of motion);stair training;strengthening;stretching;transfer training;progressive activity/exercise   Risk & Benefits of therapy have been explained evaluation/treatment results reviewed;care plan/treatment goals reviewed;risks/benefits reviewed;current/potential barriers reviewed;participants voiced agreement with care plan;patient;participants included   PT Total Evaluation Time   PT Eval, Low Complexity Minutes (37980) 10   Therapy Certification   Start of care date 05/06/24   Certification date from 05/06/24   Certification date to 05/09/24   Medical Diagnosis TKA   Physical Therapy Goals   PT Frequency 2x/day   PT Predicted Duration/Target Date for Goal Attainment 05/09/24   PT Goals Bed Mobility;Transfers;Gait;Stairs   PT: Bed Mobility Independent;Supine to/from sit   PT: Transfers Modified independent;Sit to/from stand;Bed to/from chair;Assistive device   PT: Gait Modified independent;50 feet;Rolling walker;Supervision/stand-by assist;Greater than 200 feet   PT: Stairs Supervision/stand-by assist;Greater than 10 stairs;Rail on left  (15)   Interventions   Interventions Quick Adds Therapeutic Activity;Gait Training;Therapeutic Procedure   PT Discharge Planning   PT Plan bed mobility, transfers, gait, stairs, TKA exercises as time allows handout in rm   PT Rationale for DC Rec per pt plan for HHPT, has FWW at home   PT Brief overview of  current status A x 1 FWW,predict will meet goals after AM session   Total Session Time   Total Session Time (sum of timed and untimed services) 10         [facility]  OUTPATIENT PHYSICAL THERAPY EVALUATION  PLAN OF TREATMENT FOR OUTPATIENT REHABILITATION  (COMPLETE FOR INITIAL CLAIMS ONLY)  Patient's Last Name, First Name, M.I.  YOB: 1950  Renetta Bender                        Provider's Name  [facility] Medical Record No.  [id number]                             Onset Date:  (P) 05/06/24   Start of Care Date:  (P) 05/06/24   Type:     _X_PT   ___OT   ___SLP Medical Diagnosis:  (P) TKA              PT Diagnosis:  (P) impaired IND wiht mobility from baseline Visits from SOC:  1     See note for plan of treatment, functional goals and certification details    I CERTIFY THE NEED FOR THESE SERVICES FURNISHED UNDER        THIS PLAN OF TREATMENT AND WHILE UNDER MY CARE     (Physician co-signature of this document indicates review and certification of the therapy plan).

## 2024-05-07 ENCOUNTER — APPOINTMENT (OUTPATIENT)
Dept: PHYSICAL THERAPY | Facility: CLINIC | Age: 74
End: 2024-05-07
Attending: ORTHOPAEDIC SURGERY
Payer: COMMERCIAL

## 2024-05-07 VITALS
SYSTOLIC BLOOD PRESSURE: 143 MMHG | OXYGEN SATURATION: 97 % | HEART RATE: 75 BPM | RESPIRATION RATE: 16 BRPM | BODY MASS INDEX: 27.96 KG/M2 | DIASTOLIC BLOOD PRESSURE: 72 MMHG | WEIGHT: 163.8 LBS | HEIGHT: 64 IN | TEMPERATURE: 97.6 F

## 2024-05-07 LAB
FASTING STATUS PATIENT QL REPORTED: YES
GLUCOSE SERPL-MCNC: 109 MG/DL (ref 70–99)
HGB BLD-MCNC: 11 G/DL (ref 11.7–15.7)

## 2024-05-07 PROCEDURE — 250N000013 HC RX MED GY IP 250 OP 250 PS 637: Performed by: ORTHOPAEDIC SURGERY

## 2024-05-07 PROCEDURE — 97116 GAIT TRAINING THERAPY: CPT | Mod: GP

## 2024-05-07 PROCEDURE — 82947 ASSAY GLUCOSE BLOOD QUANT: CPT | Performed by: ORTHOPAEDIC SURGERY

## 2024-05-07 PROCEDURE — 85018 HEMOGLOBIN: CPT | Performed by: ORTHOPAEDIC SURGERY

## 2024-05-07 PROCEDURE — 97110 THERAPEUTIC EXERCISES: CPT | Mod: GP

## 2024-05-07 PROCEDURE — 36415 COLL VENOUS BLD VENIPUNCTURE: CPT | Performed by: ORTHOPAEDIC SURGERY

## 2024-05-07 RX ORDER — OXYCODONE HYDROCHLORIDE 5 MG/1
5-10 TABLET ORAL EVERY 4 HOURS PRN
Qty: 30 TABLET | Refills: 0 | Status: SHIPPED | OUTPATIENT
Start: 2024-05-07 | End: 2024-09-26

## 2024-05-07 RX ORDER — HYDROXYZINE HYDROCHLORIDE 10 MG/1
10 TABLET, FILM COATED ORAL EVERY 6 HOURS PRN
Qty: 30 TABLET | Refills: 0 | Status: SHIPPED | OUTPATIENT
Start: 2024-05-07 | End: 2024-09-26

## 2024-05-07 RX ADMIN — POLYETHYLENE GLYCOL 3350 17 G: 17 POWDER, FOR SOLUTION ORAL at 08:04

## 2024-05-07 RX ADMIN — LEVOTHYROXINE SODIUM 75 MCG: 75 TABLET ORAL at 06:00

## 2024-05-07 RX ADMIN — ASPIRIN 325 MG: 325 TABLET, COATED ORAL at 08:04

## 2024-05-07 RX ADMIN — OXYCODONE HYDROCHLORIDE 5 MG: 5 TABLET ORAL at 08:04

## 2024-05-07 RX ADMIN — SENNOSIDES AND DOCUSATE SODIUM 1 TABLET: 50; 8.6 TABLET ORAL at 08:04

## 2024-05-07 RX ADMIN — ACETAMINOPHEN 975 MG: 325 TABLET, FILM COATED ORAL at 05:59

## 2024-05-07 RX ADMIN — OXYCODONE HYDROCHLORIDE 5 MG: 5 TABLET ORAL at 00:48

## 2024-05-07 ASSESSMENT — ACTIVITIES OF DAILY LIVING (ADL)
ADLS_ACUITY_SCORE: 35

## 2024-05-07 NOTE — CONSULTS
Care Management Discharge Note    Discharge Date: 05/07/2024       Discharge Disposition:  Home    Discharge Services:  Home Care-[facility] (PT)    Discharge DME: Walker     Discharge Transportation:  family    Private pay costs discussed: Not applicable    Does the patient's insurance plan have a 3 day qualifying hospital stay waiver?  No    PAS Confirmation Code:  n/a  Patient/family educated on Medicare website which has current facility and service quality ratings:  yes    Education Provided on the Discharge Plan:  yes  Persons Notified of Discharge Plans: Patient, bedside RN, Charge  Patient/Family in Agreement with the Plan:  yes    Handoff Referral Completed: No    Additional Information:  Writer informed this morning that patient has been medically cleared to discharge home today. Home Care has been ordered. Liaison from [facility] has already arranged Home Care services with [facility] for Physical Therapy. No further discharge needs.          Regina Horowitz RN  Care Coordinator  [phone]

## 2024-05-07 NOTE — PLAN OF CARE
OT orders received, however, per PT's assessment and discussion with patient, it was determined that patient had no acute OT needs, therefore, OT to sign off and complete order/cancel evaluation.

## 2024-05-07 NOTE — PLAN OF CARE
Physical Therapy Discharge Summary    Reason for therapy discharge:    All goals and outcomes met, no further needs identified.    Progress towards therapy goal(s). See goals on Care Plan in [facility] electronic health record for goal details.  Goals met    Therapy recommendation(s):    Defer to ortho team

## 2024-05-07 NOTE — PROGRESS NOTES
Reviewed discharge with pt.  Verbalized understanding, questions answered. Paperwork and RX given.  All belongings sent. Pt to discharge home with brother.

## 2024-05-07 NOTE — PLAN OF CARE
Goal Outcome Evaluation:    Patient vital signs are at baseline: Yes  Patient able to ambulate as they were prior to admission or with assist devices provided by therapies during their stay:  Yes  Patient MUST void prior to discharge:  Yes  Patient able to tolerate oral intake:  Yes  Pain has adequate pain control using Oral analgesics:  Yes  Does patient have an identified :  Yes  Has goal D/C date and time been discussed with patient:  Yes    A/O x4. CMS intact. Dressing CDI. PIV sl.

## 2024-05-07 NOTE — PROGRESS NOTES
"POD# 1    SUBJECTIVE  Pain well managed  Mobility improving    OBJECTIVE:   /82 (BP Location: Right arm, Patient Position: Sitting, Cuff Size: Adult Regular)   Pulse 77   Temp 97.5  F (36.4  C) (Oral)   Resp 16   Ht 1.626 m (5' 4\")   Wt 74.3 kg (163 lb 12.8 oz)   SpO2 96%   BMI 28.12 kg/m     Hemoglobin   Date Value Ref Range Status   02/13/2024 9.9 (L) 11.7 - 15.7 g/dL Final   11/01/2014 14.1 11.7 - 15.7 g/dL Final   ]   Wound: Dressing dry      ASSESSMENT   Stable    PLAN   Mobilize in PT  Home later today with home PT  Follow up 2 weeks post-op    Cem Lopez MD POD# 1    SUBJECTIVE  Pain well managed  Mobility improving    OBJECTIVE:   /82 (BP Location: Right arm, Patient Position: Sitting, Cuff Size: Adult Regular)   Pulse 77   Temp 97.5  F (36.4  C) (Oral)   Resp 16   Ht 1.626 m (5' 4\")   Wt 74.3 kg (163 lb 12.8 oz)   SpO2 96%   BMI 28.12 kg/m     Hemoglobin   Date Value Ref Range Status   02/13/2024 9.9 (L) 11.7 - 15.7 g/dL Final   11/01/2014 14.1 11.7 - 15.7 g/dL Final   ]   Wound: Dressing dry  Hgb pending    ASSESSMENT   Stable    PLAN   Mobilize in PT  Home later today  Follow up 2 weeks post-op    Cem Lopez MD   "

## 2024-09-25 NOTE — PROGRESS NOTES
SUBJECTIVE:                                                   Renetta Bender is a 74 year old female who presents to clinic today for the following health issue(s):  Patient presents with:  Vaginal Problem: Vaginal itching and burning for 3 weeks. Denies vaginal discharge or odor.    HPI:  Patient has been having vaginal burning and itching x 3 weeks. Denies odor, discharge, fevers, chills, N/v. Does not use any vaginal medications.     No LMP recorded. Patient is postmenopausal..     Patient is not sexually active, .  Using menopause for contraception.    reports that she has never smoked. She has never used smokeless tobacco.    STD testing offered?  Declined - not sexually active    Health maintenance updated:  yes    Overdue          Never done DEXA (Every 15 Years)     Never done TSH W/FREE T4 REFLEX (Yearly)     Never done HEPATITIS C SCREENING (Once)     Never done LIPID (Every 5 Years)     Never done RSV VACCINE (1 - Risk 60-74 years 1-dose series)     Never done FALL RISK ASSESSMENT (Yearly)     2023 MAMMO SCREENING (Every 2 Years)  Last completed:  PHQ-2 (once per calendar year) (Yearly, January to December)  Last completed: Feb 10, 2016     SEP 1  2024 INFLUENZA VACCINE (1)  Last completed: Sep 22, 2023       Today's PHQ-2 Score:       2024     1:18 PM   PHQ-2 (  [employer])   Q1: Little interest or pleasure in doing things 0   Q2: Feeling down, depressed or hopeless 0   PHQ-2 Score 0     Today's PHQ-9 Score:       2024     1:18 PM   PHQ-9 SCORE   PHQ-9 Total Score 0     Today's SAMEER-7 Score:       2024     1:18 PM   SAMEER-7 SCORE   Total Score 0       Problem list and histories reviewed & adjusted, as indicated.  Additional history: as documented.    Patient Active Problem List   Diagnosis    S/P total hip arthroplasty    S/P total knee arthroplasty, left     Past Surgical History:   Procedure Laterality Date    ARTHROPLASTY HIP ANTERIOR Right  "02/12/2024    Procedure: RIGHT DIRECT ANTERIOR TOTAL HIP ARTHROPLASTY;  Surgeon: Cem Lopez MD;  Location: [facility] OR    ARTHROPLASTY KNEE Left 5/6/2024    Procedure: Left total knee arthroplasty;  Surgeon: Cem Lopez MD;  Location: [facility] OR    BIOPSY/EXCISE CERVICAL LESION      benign    COLONOSCOPY  09/2022      Social History     Tobacco Use    Smoking status: Never    Smokeless tobacco: Never   Substance Use Topics    Alcohol use: Yes     Comment: Special occasions only      Problem (# of Occurrences) Relation (Name,Age of Onset)    Hypertension (1) Mother    Osteoporosis (1) Mother    Breast Cancer (1) Other: aunt    Hyperlipidemia (1) Mother              Current Outpatient Medications   Medication Sig Dispense Refill    levothyroxine (SYNTHROID, LEVOTHROID) 75 MCG tablet Take by mouth daily      Multiple Vitamins-Minerals (HAIR SKIN AND NAILS FORMULA PO) Take by mouth.      multivitamin, therapeutic with minerals (MULTI-VITAMIN) TABS Take 1 tablet by mouth daily       No current facility-administered medications for this visit.     Allergies   Allergen Reactions    Adhesive Tape Itching and Rash    Penicillin G Hives and Rash    Sulfa Antibiotics Rash       OBJECTIVE:     /84 (BP Location: Right arm)   Ht 1.626 m (5' 4\")   Wt 76.7 kg (169 lb)   BMI 29.01 kg/m    Body mass index is 29.01 kg/m .    Exam:  Constitutional:  Appearance: Well nourished, well developed alert, in no acute distress  Psychiatric:  Mentation appears normal and affect normal/bright.  Pelvic Exam:  External Genitalia:     Normal appearance for age, no discharge present, no tenderness present, no inflammatory lesions present, color normal  Vagina:     Normal vaginal vault without central or paravaginal defects, ATROPHIC- thin and dry  Bladder:     Nontender to palpation  Urethra:   Urethral Body:  Urethra palpation normal, urethra structural support normal   Urethral Meatus:  No erythema or lesions present  Perineum:  "    Perineum within normal limits, no evidence of trauma, no rashes or skin lesions present  Inguinal Lymph Nodes:     No lymphadenopathy present     In-Clinic Test Results:  No results found for this or any previous visit (from the past 24 hour(s)).    ASSESSMENT/PLAN:                                                        ICD-10-CM    1. Vaginal atrophy  N95.2       2. Itching of vagina  N89.8           -due to atrophy recommend starting estradiol nightly x 4 weeks  -follow up for tissue check in four weeks  -MVP sent    Angelia Denise PA-C  [facility] [place]

## 2024-09-26 ENCOUNTER — OFFICE VISIT (OUTPATIENT)
Dept: OBGYN | Facility: CLINIC | Age: 74
End: 2024-09-26
Payer: COMMERCIAL

## 2024-09-26 VITALS
HEIGHT: 64 IN | WEIGHT: 169 LBS | BODY MASS INDEX: 28.85 KG/M2 | SYSTOLIC BLOOD PRESSURE: 128 MMHG | DIASTOLIC BLOOD PRESSURE: 84 MMHG

## 2024-09-26 DIAGNOSIS — N95.2 VAGINAL ATROPHY: Primary | ICD-10-CM

## 2024-09-26 DIAGNOSIS — N89.8 ITCHING OF VAGINA: ICD-10-CM

## 2024-09-26 LAB
BACTERIAL VAGINOSIS VAG-IMP: NEGATIVE
CANDIDA DNA VAG QL NAA+PROBE: NOT DETECTED
CANDIDA GLABRATA / CANDIDA KRUSEI DNA: NOT DETECTED
T VAGINALIS DNA VAG QL NAA+PROBE: NOT DETECTED

## 2024-09-26 PROCEDURE — 0352U MULTIPLEX VAGINAL PANEL BY PCR: CPT

## 2024-09-26 PROCEDURE — 99203 OFFICE O/P NEW LOW 30 MIN: CPT

## 2024-09-26 RX ORDER — ESTRADIOL 0.1 MG/G
1 CREAM VAGINAL DAILY
Qty: 42.5 G | Refills: 4 | Status: SHIPPED | OUTPATIENT
Start: 2024-09-26

## 2024-09-26 ASSESSMENT — ANXIETY QUESTIONNAIRES
IF YOU CHECKED OFF ANY PROBLEMS ON THIS QUESTIONNAIRE, HOW DIFFICULT HAVE THESE PROBLEMS MADE IT FOR YOU TO DO YOUR WORK, TAKE CARE OF THINGS AT HOME, OR GET ALONG WITH OTHER PEOPLE: NOT DIFFICULT AT ALL
1. FEELING NERVOUS, ANXIOUS, OR ON EDGE: NOT AT ALL
3. WORRYING TOO MUCH ABOUT DIFFERENT THINGS: NOT AT ALL
6. BECOMING EASILY ANNOYED OR IRRITABLE: NOT AT ALL
5. BEING SO RESTLESS THAT IT IS HARD TO SIT STILL: NOT AT ALL
2. NOT BEING ABLE TO STOP OR CONTROL WORRYING: NOT AT ALL
GAD7 TOTAL SCORE: 0
7. FEELING AFRAID AS IF SOMETHING AWFUL MIGHT HAPPEN: NOT AT ALL
GAD7 TOTAL SCORE: 0

## 2024-09-26 ASSESSMENT — PATIENT HEALTH QUESTIONNAIRE - PHQ9
SUM OF ALL RESPONSES TO PHQ QUESTIONS 1-9: 0
5. POOR APPETITE OR OVEREATING: NOT AT ALL

## 2024-10-14 NOTE — PROGRESS NOTES
SUBJECTIVE:                                                   Renetta Bender is a 74 year old female who presents to clinic today for the following health issue(s):  Patient presents with:  Vaginal Atrophy : Patient is here for vaginal atrophy follow up. Patient is using Estradiol cream patient states she has gotten some relief but symptoms are not gone.       HPI:  Corinna came in 24 for vaginal itching and irritation. Found to have atrophy and started on estradiol cream nightly. She has been using nightly and feels much better. Still has an intermittent light itching.    No LMP recorded. Patient is postmenopausal..     Patient is not sexually active, .  Using menopause for contraception.    reports that she has never smoked. She has never been exposed to tobacco smoke. She has never used smokeless tobacco.    STD testing offered?  Declined    Health maintenance updated:  yes     Overdue          Never done DEXA (Every 15 Years)     Never done TSH W/FREE T4 REFLEX (Yearly)     Never done HEPATITIS C SCREENING (Once)     Never done LIPID (Every 5 Years)     Never done RSV VACCINE (1 - Risk 60-74 years 1-dose series)     Never done FALL RISK ASSESSMENT (Yearly)     2023 MAMMO SCREENING (Every 2 Years)  Last completed: 2021     SEP 1  2024 INFLUENZA VACCINE (1)  Last completed: Sep 22, 2023         Today's PHQ-2 Score:       2024     1:18 PM   PHQ-2 (  [employer])   Q1: Little interest or pleasure in doing things 0   Q2: Feeling down, depressed or hopeless 0   PHQ-2 Score 0     Today's PHQ-9 Score:       2024     1:18 PM   PHQ-9 SCORE   PHQ-9 Total Score 0     Today's SAMEER-7 Score:       2024     1:18 PM   SAMEER-7 SCORE   Total Score 0       Problem list and histories reviewed & adjusted, as indicated.  Additional history: as documented.    Patient Active Problem List   Diagnosis    S/P total hip arthroplasty    S/P total knee arthroplasty, left     Past Surgical History:    Procedure Laterality Date    ARTHROPLASTY HIP ANTERIOR Right 02/12/2024    Procedure: RIGHT DIRECT ANTERIOR TOTAL HIP ARTHROPLASTY;  Surgeon: Cem Lopez MD;  Location: [facility]    ARTHROPLASTY KNEE Left 5/6/2024    Procedure: Left total knee arthroplasty;  Surgeon: Cem Lopez MD;  Location: [facility] OR    BIOPSY/EXCISE CERVICAL LESION      benign    COLONOSCOPY  09/2022      Social History     Tobacco Use    Smoking status: Never     Passive exposure: Never    Smokeless tobacco: Never   Substance Use Topics    Alcohol use: Yes     Comment: Special occasions only      Problem (# of Occurrences) Relation (Name,Age of Onset)    Hypertension (1) Mother    Osteoporosis (1) Mother    Breast Cancer (1) Other: aunt    Hyperlipidemia (1) Mother              Current Outpatient Medications   Medication Sig Dispense Refill    estradiol (ESTRACE) 0.1 MG/GM vaginal cream Place 1 g vaginally daily. 42.5 g 4    levothyroxine (SYNTHROID, LEVOTHROID) 75 MCG tablet Take by mouth daily      multivitamin, therapeutic with minerals (MULTI-VITAMIN) TABS Take 1 tablet by mouth daily      Multiple Vitamins-Minerals (HAIR SKIN AND NAILS FORMULA PO) Take by mouth.       No current facility-administered medications for this visit.     Allergies   Allergen Reactions    Adhesive Tape Itching and Rash    Penicillin G Hives and Rash    Sulfa Antibiotics Rash       OBJECTIVE:     There were no vitals taken for this visit.  There is no height or weight on file to calculate BMI.    Exam:  Constitutional:  Appearance: Well nourished, well developed alert, in no acute distress  Psychiatric:  Mentation appears normal and affect normal/bright.  Pelvic Exam:  External Genitalia:     Normal appearance for age, no discharge present, no tenderness present, no inflammatory lesions present, color normal  Vagina:     Normal vaginal vault without central or paravaginal defects, ATROPHIC- improving  Bladder:     Nontender to  palpation  Urethra:   Urethral Body:  Urethra palpation normal, urethra structural support normal   Urethral Meatus:  No erythema or lesions present    Perineum:     Perineum within normal limits, no evidence of trauma, no rashes or skin lesions present  Inguinal Lymph Nodes:     No lymphadenopathy present     In-Clinic Test Results:  No results found for this or any previous visit (from the past 24 hours).    ASSESSMENT/PLAN:                                                        ICD-10-CM    1. Vaginal atrophy  N95.2           There are no Patient Instructions on file for this visit.    -atrophy improving  -decrease estradiol cream to MWF  -discussed triple mix PRN for vaginal irritation    SOHAM Polanco [facility] [place]

## 2024-10-24 ENCOUNTER — OFFICE VISIT (OUTPATIENT)
Dept: OBGYN | Facility: CLINIC | Age: 74
End: 2024-10-24
Payer: COMMERCIAL

## 2024-10-24 DIAGNOSIS — N95.2 VAGINAL ATROPHY: Primary | ICD-10-CM

## 2024-10-24 PROCEDURE — 99459 PELVIC EXAMINATION: CPT

## 2024-10-24 PROCEDURE — 99214 OFFICE O/P EST MOD 30 MIN: CPT

## 2025-03-22 ENCOUNTER — HEALTH MAINTENANCE LETTER (OUTPATIENT)
Age: 75
End: 2025-03-22

## (undated) DEVICE — SYR 30ML LL W/O NDL 302832

## (undated) DEVICE — NDL SPINAL 18GA 3.5" 405184

## (undated) DEVICE — GLOVE BIOGEL PI SZ 7.5 40875

## (undated) DEVICE — SU VICRYL 2-0 CT-1 27" UND J259H

## (undated) DEVICE — BLADE SAW SAGITTAL STRK 25X79.5X1.24MM 4/2000 2108-318-000

## (undated) DEVICE — MANIFOLD NEPTUNE 4 PORT 700-20

## (undated) DEVICE — LINEN TOWEL PACK X5 5464

## (undated) DEVICE — BONE CEMENT MIXEVAC III HI VAC KIT  0206-015-000

## (undated) DEVICE — GLOVE BIOGEL PI MICRO INDICATOR UNDERGLOVE SZ 7.5 48975

## (undated) DEVICE — SOL WATER IRRIG 1000ML BOTTLE 2F7114

## (undated) DEVICE — ESU GROUND PAD UNIVERSAL W/O CORD

## (undated) DEVICE — DRSG AQUACEL AG 3.5X9.75" HYDROFIBER 412011

## (undated) DEVICE — SUTURE STRATAFIX SPIRAL MONOCRYL PLUS 3-0 PS-1 SP SXMP1B101

## (undated) DEVICE — SYR 50ML LL W/O NDL 309653

## (undated) DEVICE — DECANTER BAG 2002S

## (undated) DEVICE — DRSG AQUACEL AG HYDROFIBER 3.5X6" 422604

## (undated) DEVICE — WRAP EZY KNEE 1213PP

## (undated) DEVICE — STPL SKIN 35W 6.9MM  PXW35

## (undated) DEVICE — DRAPE IOBAN INCISE 23X17" 6650EZ

## (undated) DEVICE — SOL NACL 0.9% IRRIG 1000ML BOTTLE 2F7124

## (undated) DEVICE — DRAPE SHEET REV FOLD 3/4 9349

## (undated) DEVICE — SOLUTION WOUND CLEANSING 3/4OZ 10% PVP EA-L3011FB-50

## (undated) DEVICE — GLOVE SENSICARE PI MICRO PF 6.5 LATEX FREE MSG9665

## (undated) DEVICE — SUCTION IRR SYSTEM W/O TIP INTERPULSE HANDPIECE 0210-100-000

## (undated) DEVICE — DRAPE C-ARM 60X42" 1013

## (undated) DEVICE — BLADE SAW SAGITTAL STRK 18X90X1.27MM HD SYS 6 6118-127-090

## (undated) DEVICE — DRAPE STERI TOWEL LG 1010

## (undated) DEVICE — SU VICRYL 0 CT-1 CR 8X18" J740D

## (undated) DEVICE — DRSG AQUACEL AG HYDROFIBER  3.5X10" 422605

## (undated) DEVICE — DRSG ADAPTIC 3X8" 6113

## (undated) DEVICE — GLOVE SENSICARE PI ORTHO PF 7.0 LATEX FREE MSG9470

## (undated) DEVICE — PACK TOTAL HIP W/U DRAPE SOP15HUFSC

## (undated) DEVICE — PACK TOTAL KNEE SOP15TKFSD

## (undated) DEVICE — GLOVE BIOGEL PI ORTHOPRO SZ 7.5 47675

## (undated) DEVICE — SU VICRYL 1 CTX 36" J371H

## (undated) DEVICE — GOWN IMPERVIOUS SPECIALTY XLG/XLONG 32474

## (undated) DEVICE — GLOVE BIOGEL PI MICRO INDICATOR UNDERGLOVE SZ 8.0 48980

## (undated) DEVICE — SOL NACL 0.9% INJ 1000ML BAG 2B1324X

## (undated) DEVICE — BLADE SAW SAGITTAL STRK MED WIDE 25X73X0.89MM 2108-105-000

## (undated) DEVICE — CAST PADDING 6" STERILE 9046S

## (undated) DEVICE — SU ETHIBOND 1 CT-1 30" X425H

## (undated) DEVICE — BONE CLEANING TIP INTERPULSE  0210-010-000

## (undated) DEVICE — DRAPE CONVERTORS U-DRAPE 60X72" 8476

## (undated) DEVICE — DRILL BIT BIOM QUICK CONNECTING RING LOCK 3.2X20MM 31-323220

## (undated) DEVICE — SOL NACL 0.9% IRRIG 3000ML BAG 2B7477

## (undated) RX ORDER — ONDANSETRON 2 MG/ML
INJECTION INTRAMUSCULAR; INTRAVENOUS
Status: DISPENSED
Start: 2024-02-12

## (undated) RX ORDER — PROPOFOL 10 MG/ML
INJECTION, EMULSION INTRAVENOUS
Status: DISPENSED
Start: 2024-02-12

## (undated) RX ORDER — CEFAZOLIN SODIUM/WATER 2 G/20 ML
SYRINGE (ML) INTRAVENOUS
Status: DISPENSED
Start: 2024-05-06

## (undated) RX ORDER — FENTANYL CITRATE 50 UG/ML
INJECTION, SOLUTION INTRAMUSCULAR; INTRAVENOUS
Status: DISPENSED
Start: 2024-02-12

## (undated) RX ORDER — TRANEXAMIC ACID 650 MG/1
TABLET ORAL
Status: DISPENSED
Start: 2024-05-06

## (undated) RX ORDER — VANCOMYCIN HYDROCHLORIDE 1 G/20ML
INJECTION, POWDER, LYOPHILIZED, FOR SOLUTION INTRAVENOUS
Status: DISPENSED
Start: 2024-02-12

## (undated) RX ORDER — TRANEXAMIC ACID 650 MG/1
TABLET ORAL
Status: DISPENSED
Start: 2024-02-12

## (undated) RX ORDER — DEXAMETHASONE SODIUM PHOSPHATE 4 MG/ML
INJECTION, SOLUTION INTRA-ARTICULAR; INTRALESIONAL; INTRAMUSCULAR; INTRAVENOUS; SOFT TISSUE
Status: DISPENSED
Start: 2024-02-12

## (undated) RX ORDER — EPHEDRINE SULFATE 50 MG/ML
INJECTION, SOLUTION INTRAMUSCULAR; INTRAVENOUS; SUBCUTANEOUS
Status: DISPENSED
Start: 2024-02-12

## (undated) RX ORDER — FENTANYL CITRATE 0.05 MG/ML
INJECTION, SOLUTION INTRAMUSCULAR; INTRAVENOUS
Status: DISPENSED
Start: 2024-05-06

## (undated) RX ORDER — HYDROMORPHONE HYDROCHLORIDE 1 MG/ML
INJECTION, SOLUTION INTRAMUSCULAR; INTRAVENOUS; SUBCUTANEOUS
Status: DISPENSED
Start: 2024-02-12

## (undated) RX ORDER — FENTANYL CITRATE 0.05 MG/ML
INJECTION, SOLUTION INTRAMUSCULAR; INTRAVENOUS
Status: DISPENSED
Start: 2024-02-12

## (undated) RX ORDER — HYDROMORPHONE HYDROCHLORIDE 1 MG/ML
INJECTION, SOLUTION INTRAMUSCULAR; INTRAVENOUS; SUBCUTANEOUS
Status: DISPENSED
Start: 2024-05-06

## (undated) RX ORDER — PROPOFOL 10 MG/ML
INJECTION, EMULSION INTRAVENOUS
Status: DISPENSED
Start: 2024-05-06

## (undated) RX ORDER — FENTANYL CITRATE 50 UG/ML
INJECTION, SOLUTION INTRAMUSCULAR; INTRAVENOUS
Status: DISPENSED
Start: 2024-05-06

## (undated) RX ORDER — ONDANSETRON 2 MG/ML
INJECTION INTRAMUSCULAR; INTRAVENOUS
Status: DISPENSED
Start: 2024-05-06